# Patient Record
Sex: FEMALE | Race: WHITE | NOT HISPANIC OR LATINO
[De-identification: names, ages, dates, MRNs, and addresses within clinical notes are randomized per-mention and may not be internally consistent; named-entity substitution may affect disease eponyms.]

---

## 2017-12-01 ENCOUNTER — APPOINTMENT (OUTPATIENT)
Dept: GASTROENTEROLOGY | Facility: CLINIC | Age: 67
End: 2017-12-01
Payer: MEDICARE

## 2017-12-01 PROCEDURE — 99214 OFFICE O/P EST MOD 30 MIN: CPT

## 2017-12-01 PROCEDURE — 82274 ASSAY TEST FOR BLOOD FECAL: CPT | Mod: QW

## 2018-11-20 ENCOUNTER — APPOINTMENT (OUTPATIENT)
Dept: GASTROENTEROLOGY | Facility: CLINIC | Age: 68
End: 2018-11-20
Payer: MEDICARE

## 2018-11-20 VITALS
HEIGHT: 62 IN | WEIGHT: 111 LBS | DIASTOLIC BLOOD PRESSURE: 65 MMHG | SYSTOLIC BLOOD PRESSURE: 114 MMHG | BODY MASS INDEX: 20.43 KG/M2 | HEART RATE: 73 BPM

## 2018-11-20 DIAGNOSIS — Z85.72 PERSONAL HISTORY OF NON-HODGKIN LYMPHOMAS: ICD-10-CM

## 2018-11-20 DIAGNOSIS — Z80.1 FAMILY HISTORY OF MALIGNANT NEOPLASM OF TRACHEA, BRONCHUS AND LUNG: ICD-10-CM

## 2018-11-20 DIAGNOSIS — M19.90 UNSPECIFIED OSTEOARTHRITIS, UNSPECIFIED SITE: ICD-10-CM

## 2018-11-20 PROCEDURE — 99214 OFFICE O/P EST MOD 30 MIN: CPT

## 2018-11-20 PROCEDURE — 82274 ASSAY TEST FOR BLOOD FECAL: CPT | Mod: QW

## 2019-04-12 ENCOUNTER — APPOINTMENT (OUTPATIENT)
Dept: OTOLARYNGOLOGY | Facility: CLINIC | Age: 69
End: 2019-04-12
Payer: MEDICARE

## 2019-04-12 VITALS
WEIGHT: 115 LBS | HEIGHT: 62 IN | SYSTOLIC BLOOD PRESSURE: 116 MMHG | BODY MASS INDEX: 21.16 KG/M2 | DIASTOLIC BLOOD PRESSURE: 70 MMHG | HEART RATE: 56 BPM

## 2019-04-12 DIAGNOSIS — J00 ACUTE NASOPHARYNGITIS [COMMON COLD]: ICD-10-CM

## 2019-04-12 PROCEDURE — 31231 NASAL ENDOSCOPY DX: CPT

## 2019-04-12 PROCEDURE — 99203 OFFICE O/P NEW LOW 30 MIN: CPT | Mod: 25

## 2019-04-12 NOTE — CONSULT LETTER
[Dear  ___] : Dear  [unfilled], [Consult Closing:] : Thank you very much for allowing me to participate in the care of this patient.  If you have any questions, please do not hesitate to contact me. [Please see my note below.] : Please see my note below. [Consult Letter:] : I had the pleasure of evaluating your patient, [unfilled]. [FreeTextEntry3] : Herve Baldwin MD\par  [Sincerely,] : Sincerely,

## 2019-04-12 NOTE — ASSESSMENT
[FreeTextEntry1] : KIMMIE WARD most probably ahs fungal glossitis. I cultured her tongue and will start her on Mycelex troches. RTC 3 weeks.

## 2019-04-12 NOTE — HISTORY OF PRESENT ILLNESS
[de-identified] : KIMMIE WARD is a 68 year woman with a history of abrupt onset (March 7,2019) of burning tongue, tingling tongue and loss of taste. She has not been on antibiotics or steroids recently. She has a cold for about 5 days with drainage.\par She noted a painless mass on her right lateral tongue that is getting smaller.

## 2019-04-23 LAB — FUNGUS SPEC CULT ORG #8: ABNORMAL

## 2019-05-21 ENCOUNTER — APPOINTMENT (OUTPATIENT)
Dept: OTOLARYNGOLOGY | Facility: CLINIC | Age: 69
End: 2019-05-21
Payer: MEDICARE

## 2019-05-21 VITALS
HEIGHT: 62 IN | BODY MASS INDEX: 21.16 KG/M2 | WEIGHT: 115 LBS | HEART RATE: 58 BPM | SYSTOLIC BLOOD PRESSURE: 112 MMHG | DIASTOLIC BLOOD PRESSURE: 72 MMHG

## 2019-05-21 DIAGNOSIS — K14.0 GLOSSITIS: ICD-10-CM

## 2019-05-21 PROCEDURE — 99213 OFFICE O/P EST LOW 20 MIN: CPT

## 2019-05-21 NOTE — ASSESSMENT
[FreeTextEntry1] : KIMMIE WARD is feeling much better. I suggested rinsing with salt water and RTC 8 weeks to reevaluate tongue.

## 2019-05-21 NOTE — HISTORY OF PRESENT ILLNESS
[de-identified] : KIMMIE WARD is a 68 year woman with a history of abrupt onset (March 7,2019) of burning tongue, tingling tongue and loss of taste. I treated her with Mycelex and she is much better. She still has small area on RIGHT lateral tongue that seems to get bigger and smaller.

## 2019-11-07 ENCOUNTER — APPOINTMENT (OUTPATIENT)
Dept: GASTROENTEROLOGY | Facility: CLINIC | Age: 69
End: 2019-11-07
Payer: MEDICARE

## 2019-11-07 VITALS
WEIGHT: 115 LBS | BODY MASS INDEX: 21.16 KG/M2 | DIASTOLIC BLOOD PRESSURE: 59 MMHG | HEART RATE: 68 BPM | SYSTOLIC BLOOD PRESSURE: 95 MMHG | HEIGHT: 62 IN

## 2019-11-07 DIAGNOSIS — Z85.79 PERSONAL HISTORY OF OTHER MALIGNANT NEOPLASMS OF LYMPHOID, HEMATOPOIETIC AND RELATED TISSUES: ICD-10-CM

## 2019-11-07 DIAGNOSIS — K14.6 GLOSSODYNIA: ICD-10-CM

## 2019-11-07 PROCEDURE — 99214 OFFICE O/P EST MOD 30 MIN: CPT

## 2019-11-07 PROCEDURE — 82274 ASSAY TEST FOR BLOOD FECAL: CPT | Mod: QW

## 2019-11-07 RX ORDER — CLOTRIMAZOLE 10 MG/1
10 LOZENGE ORAL DAILY
Qty: 50 | Refills: 0 | Status: DISCONTINUED | COMMUNITY
Start: 2019-04-15 | End: 2019-11-07

## 2019-11-07 NOTE — PHYSICAL EXAM
[General Appearance - Well Developed] : well developed [General Appearance - In No Acute Distress] : in no acute distress [General Appearance - Alert] : alert [General Appearance - Well Nourished] : well nourished [General Appearance - Well-Appearing] : healthy appearing [Sclera] : the sclera and conjunctiva were normal [Outer Ear] : the ears and nose were normal in appearance [Oropharynx] : the oropharynx was normal [Neck Appearance] : the appearance of the neck was normal [Neck Cervical Mass (___cm)] : no neck mass was observed [Thyroid Diffuse Enlargement] : the thyroid was not enlarged [Thyroid Nodule] : there were no palpable thyroid nodules [Jugular Venous Distention Increased] : there was no jugular-venous distention [Auscultation Breath Sounds / Voice Sounds] : lungs were clear to auscultation bilaterally [Heart Rate And Rhythm] : heart rate was normal and rhythm regular [Heart Sounds Gallop] : no gallops [Heart Sounds] : normal S1 and S2 [Murmurs] : no murmurs [Full Pulse] : the pedal pulses are present [Heart Sounds Pericardial Friction Rub] : no pericardial rub [Edema] : there was no peripheral edema [Bowel Sounds] : normal bowel sounds [Abdomen Soft] : soft [Abdomen Tenderness] : non-tender [Abdomen Mass (___ Cm)] : no abdominal mass palpated [Abdomen Hernia] : no hernia was discovered [Normal Sphincter Tone] : normal sphincter tone [No Rectal Mass] : no rectal mass [Internal Hemorrhoid] : internal hemorrhoids [Occult Blood Positive] : stool positive for occult blood [Cervical Lymph Nodes Enlarged Anterior Bilaterally] : anterior cervical [Cervical Lymph Nodes Enlarged Posterior Bilaterally] : posterior cervical [Axillary Lymph Nodes Enlarged Bilaterally] : axillary [Supraclavicular Lymph Nodes Enlarged Bilaterally] : supraclavicular [Femoral Lymph Nodes Enlarged Bilaterally] : femoral [No CVA Tenderness] : no ~M costovertebral angle tenderness [Inguinal Lymph Nodes Enlarged Bilaterally] : inguinal [No Spinal Tenderness] : no spinal tenderness [Abnormal Walk] : normal gait [Musculoskeletal - Swelling] : no joint swelling seen [Nail Clubbing] : no clubbing  or cyanosis of the fingernails [Motor Tone] : muscle strength and tone were normal [Skin Turgor] : normal skin turgor [Skin Color & Pigmentation] : normal skin color and pigmentation [] : no rash [Oriented To Time, Place, And Person] : oriented to person, place, and time [Impaired Insight] : insight and judgment were intact [Affect] : the affect was normal [External Hemorrhoid] : no external hemorrhoids [FreeTextEntry1] : FIT negative

## 2019-11-07 NOTE — HISTORY OF PRESENT ILLNESS
[FreeTextEntry1] : Rosalba reports mouth burning, but ENT and oral surgical evaluations have been unrevealing. She denies GI symptoms at this time, maintained on a "low acid diet." She is on no medicines at this time.

## 2019-11-07 NOTE — ASSESSMENT
[FreeTextEntry1] : 1. Guaiac positive stool on today's exam--source unclear. \par 2. History of GERD; duodenal ulcer with duodenitis, gastric polyps, reflux and mild lymphocytic esophagitis at repeat EGD November 2016.\par 3. Spasm, hemorrhoids at last colonoscopy May 2015.\par 4. History of non-Hodgkin's lymphoma in 1996, status post chemotherapy and bone marrow transplant.\par 5. Burning tongue of unclear cause--?underlying vitamin/mineral deficiency could be responsible. \par 6. Osteoarthritis.\par 7. Status post total abdominal hysterectomy.\par 8. Allergic to Augmentin.\par \par Plan:\par 1. Dr. Cueva to forward latest labs for my review.\par 2. Try multivitamin and vitamin B complex daily for at least 1-2 months to see if tongue burning subsides.\par 3. Given heme + stool on today's exam, schedule repeat colonoscopy, with probable EGD to follow-- Procedures, rationale, anesthesia plan, MiraLax prep instructions were again reviewed and brochures given.\par

## 2019-11-07 NOTE — CONSULT LETTER
[Dear  ___] : Dear  [unfilled], [Courtesy Letter:] : I had the pleasure of seeing your patient, [unfilled], in my office today. [Please see my note below.] : Please see my note below. [Consult Closing:] : Thank you very much for allowing me to participate in the care of this patient.  If you have any questions, please do not hesitate to contact me. [Sincerely,] : Sincerely, [FreeTextEntry3] : Francis Reyes M.D.\par

## 2019-11-07 NOTE — PHYSICAL EXAM
[General Appearance - Well Nourished] : well nourished [General Appearance - Well Developed] : well developed [General Appearance - In No Acute Distress] : in no acute distress [General Appearance - Alert] : alert [General Appearance - Well-Appearing] : healthy appearing [Sclera] : the sclera and conjunctiva were normal [Outer Ear] : the ears and nose were normal in appearance [Oropharynx] : the oropharynx was normal [Neck Cervical Mass (___cm)] : no neck mass was observed [Neck Appearance] : the appearance of the neck was normal [Jugular Venous Distention Increased] : there was no jugular-venous distention [Thyroid Nodule] : there were no palpable thyroid nodules [Thyroid Diffuse Enlargement] : the thyroid was not enlarged [Heart Rate And Rhythm] : heart rate was normal and rhythm regular [Auscultation Breath Sounds / Voice Sounds] : lungs were clear to auscultation bilaterally [Heart Sounds Gallop] : no gallops [Heart Sounds] : normal S1 and S2 [Murmurs] : no murmurs [Heart Sounds Pericardial Friction Rub] : no pericardial rub [Full Pulse] : the pedal pulses are present [Edema] : there was no peripheral edema [Bowel Sounds] : normal bowel sounds [Abdomen Soft] : soft [Abdomen Tenderness] : non-tender [Abdomen Mass (___ Cm)] : no abdominal mass palpated [Abdomen Hernia] : no hernia was discovered [Normal Sphincter Tone] : normal sphincter tone [No Rectal Mass] : no rectal mass [Internal Hemorrhoid] : internal hemorrhoids [Occult Blood Positive] : stool positive for occult blood [Cervical Lymph Nodes Enlarged Anterior Bilaterally] : anterior cervical [Cervical Lymph Nodes Enlarged Posterior Bilaterally] : posterior cervical [Axillary Lymph Nodes Enlarged Bilaterally] : axillary [Supraclavicular Lymph Nodes Enlarged Bilaterally] : supraclavicular [Femoral Lymph Nodes Enlarged Bilaterally] : femoral [Inguinal Lymph Nodes Enlarged Bilaterally] : inguinal [No CVA Tenderness] : no ~M costovertebral angle tenderness [No Spinal Tenderness] : no spinal tenderness [Abnormal Walk] : normal gait [Nail Clubbing] : no clubbing  or cyanosis of the fingernails [Musculoskeletal - Swelling] : no joint swelling seen [Motor Tone] : muscle strength and tone were normal [Skin Color & Pigmentation] : normal skin color and pigmentation [Skin Turgor] : normal skin turgor [] : no rash [Oriented To Time, Place, And Person] : oriented to person, place, and time [Impaired Insight] : insight and judgment were intact [Affect] : the affect was normal [External Hemorrhoid] : no external hemorrhoids [FreeTextEntry1] : FIT negative

## 2019-11-25 ENCOUNTER — LABORATORY RESULT (OUTPATIENT)
Age: 69
End: 2019-11-25

## 2019-11-25 ENCOUNTER — APPOINTMENT (OUTPATIENT)
Dept: GASTROENTEROLOGY | Facility: CLINIC | Age: 69
End: 2019-11-25
Payer: MEDICARE

## 2019-11-25 PROCEDURE — 43239 EGD BIOPSY SINGLE/MULTIPLE: CPT | Mod: 59

## 2019-11-25 PROCEDURE — 45378 DIAGNOSTIC COLONOSCOPY: CPT

## 2019-12-05 ENCOUNTER — OTHER (OUTPATIENT)
Age: 69
End: 2019-12-05

## 2020-11-17 ENCOUNTER — APPOINTMENT (OUTPATIENT)
Dept: GASTROENTEROLOGY | Facility: CLINIC | Age: 70
End: 2020-11-17
Payer: MEDICARE

## 2020-11-17 VITALS
TEMPERATURE: 97.3 F | HEIGHT: 61 IN | WEIGHT: 108 LBS | BODY MASS INDEX: 20.39 KG/M2 | DIASTOLIC BLOOD PRESSURE: 76 MMHG | HEART RATE: 69 BPM | SYSTOLIC BLOOD PRESSURE: 150 MMHG

## 2020-11-17 DIAGNOSIS — K64.9 UNSPECIFIED HEMORRHOIDS: ICD-10-CM

## 2020-11-17 DIAGNOSIS — K44.9 DIAPHRAGMATIC HERNIA W/OUT OBSTRUCTION OR GANGRENE: ICD-10-CM

## 2020-11-17 DIAGNOSIS — R19.5 OTHER FECAL ABNORMALITIES: ICD-10-CM

## 2020-11-17 DIAGNOSIS — K57.30 DIVERTICULOSIS OF LARGE INTESTINE W/OUT PERFORATION OR ABSCESS W/OUT BLEEDING: ICD-10-CM

## 2020-11-17 PROCEDURE — 99214 OFFICE O/P EST MOD 30 MIN: CPT | Mod: 25

## 2020-11-17 PROCEDURE — 82274 ASSAY TEST FOR BLOOD FECAL: CPT | Mod: QW

## 2020-11-17 PROCEDURE — 99205 OFFICE O/P NEW HI 60 MIN: CPT

## 2020-11-17 PROCEDURE — 99215 OFFICE O/P EST HI 40 MIN: CPT

## 2020-11-17 RX ORDER — OMEPRAZOLE 40 MG/1
40 CAPSULE, DELAYED RELEASE ORAL
Qty: 90 | Refills: 1 | Status: DISCONTINUED | COMMUNITY
Start: 2019-12-09 | End: 2020-11-17

## 2020-11-17 NOTE — PHYSICAL EXAM
[General Appearance - Alert] : alert [General Appearance - In No Acute Distress] : in no acute distress [General Appearance - Well Nourished] : well nourished [General Appearance - Well Developed] : well developed [General Appearance - Well-Appearing] : healthy appearing [Sclera] : the sclera and conjunctiva were normal [Neck Appearance] : the appearance of the neck was normal [Neck Cervical Mass (___cm)] : no neck mass was observed [Jugular Venous Distention Increased] : there was no jugular-venous distention [Thyroid Diffuse Enlargement] : the thyroid was not enlarged [Thyroid Nodule] : there were no palpable thyroid nodules [Auscultation Breath Sounds / Voice Sounds] : lungs were clear to auscultation bilaterally [Heart Rate And Rhythm] : heart rate was normal and rhythm regular [Heart Sounds] : normal S1 and S2 [Heart Sounds Gallop] : no gallops [Murmurs] : no murmurs [Heart Sounds Pericardial Friction Rub] : no pericardial rub [Full Pulse] : the pedal pulses are present [Edema] : there was no peripheral edema [Bowel Sounds] : normal bowel sounds [Abdomen Soft] : soft [Abdomen Tenderness] : non-tender [Abdomen Mass (___ Cm)] : no abdominal mass palpated [Abdomen Hernia] : no hernia was discovered [Normal Sphincter Tone] : normal sphincter tone [No Rectal Mass] : no rectal mass [Internal Hemorrhoid] : internal hemorrhoids [Cervical Lymph Nodes Enlarged Posterior Bilaterally] : posterior cervical [Cervical Lymph Nodes Enlarged Anterior Bilaterally] : anterior cervical [Supraclavicular Lymph Nodes Enlarged Bilaterally] : supraclavicular [Inguinal Lymph Nodes Enlarged Bilaterally] : inguinal [Abnormal Walk] : normal gait [Nail Clubbing] : no clubbing  or cyanosis of the fingernails [Musculoskeletal - Swelling] : no joint swelling seen [Skin Color & Pigmentation] : normal skin color and pigmentation [Skin Turgor] : normal skin turgor [] : no rash [Oriented To Time, Place, And Person] : oriented to person, place, and time [Impaired Insight] : insight and judgment were intact [Affect] : the affect was normal [External Hemorrhoid] : no external hemorrhoids [Occult Blood Positive] : stool was negative for occult blood [FreeTextEntry1] : prolapsing internal hemorrhoid

## 2020-11-17 NOTE — HISTORY OF PRESENT ILLNESS
[FreeTextEntry1] : She has been somewhat more constipated lately, straining to defecate, noting more issues with prolapsing hemorrhoids.  If she eats pasta too fast, sometimes it gets stuck, but ultimately goes down.  She took omeprazole 40 mg daily x 3 months after last November's EGD (hiatal hernia, nonerosive esophagitis and duodenitis), and now denies reflux symptoms off the PPI.  Repeat colonoscopy November 2019 revealed diverticulosis and hemorrhoids, with presumed adhesions.

## 2020-11-17 NOTE — ASSESSMENT
[FreeTextEntry1] : 1. History of GERD, now asymptomatic off PPI; history of duodenal ulcer, with hiatal hernia and nonerosive esophagitis and duodenitis at repeat EGD November 2019.  \par 2.  Diverticulosis, hemorrhoids, presumed adhesions at repeat colonoscopy November 2019.  Constipation could be causing issues with her prolapsing hemorrhoids.  \par 3. History of non-Hodgkin's lymphoma in 1996, status post chemotherapy and bone marrow transplant.\par 4. Osteoarthritis.\par 5. Status post total abdominal hysterectomy.\par 6. Allergic to Augmentin.\par \par Plan:\par 1. Dr. Toney to forward latest labs for my review.\par 2. Try Benefiber 1 heaping tablespoon in 8 ounces of fluid daily; can increase to twice daily dosing after a couple weeks if needed.\par 3. Contact information for [facility] given. \par 4. Return in 1 year.\par 3. No plans to repeat EGD at this time.

## 2020-12-01 ENCOUNTER — TRANSCRIPTION ENCOUNTER (OUTPATIENT)
Age: 70
End: 2020-12-01

## 2021-02-04 ENCOUNTER — APPOINTMENT (OUTPATIENT)
Dept: ENDOCRINOLOGY | Facility: CLINIC | Age: 71
End: 2021-02-04
Payer: MEDICARE

## 2021-02-04 VITALS
OXYGEN SATURATION: 96 % | SYSTOLIC BLOOD PRESSURE: 120 MMHG | TEMPERATURE: 97.8 F | BODY MASS INDEX: 20.39 KG/M2 | HEART RATE: 81 BPM | HEIGHT: 61 IN | DIASTOLIC BLOOD PRESSURE: 60 MMHG | WEIGHT: 108 LBS

## 2021-02-04 DIAGNOSIS — Z92.3 PERSONAL HISTORY OF IRRADIATION: ICD-10-CM

## 2021-02-04 PROCEDURE — 99072 ADDL SUPL MATRL&STAF TM PHE: CPT

## 2021-02-04 PROCEDURE — 99204 OFFICE O/P NEW MOD 45 MIN: CPT

## 2021-02-05 ENCOUNTER — APPOINTMENT (OUTPATIENT)
Dept: ULTRASOUND IMAGING | Facility: CLINIC | Age: 71
End: 2021-02-05
Payer: MEDICARE

## 2021-02-05 ENCOUNTER — OUTPATIENT (OUTPATIENT)
Dept: OUTPATIENT SERVICES | Facility: HOSPITAL | Age: 71
LOS: 1 days | End: 2021-02-05
Payer: MEDICARE

## 2021-02-05 DIAGNOSIS — Z00.00 ENCOUNTER FOR GENERAL ADULT MEDICAL EXAMINATION WITHOUT ABNORMAL FINDINGS: ICD-10-CM

## 2021-02-05 PROCEDURE — 76536 US EXAM OF HEAD AND NECK: CPT

## 2021-02-05 PROCEDURE — 76536 US EXAM OF HEAD AND NECK: CPT | Mod: 26

## 2021-02-11 LAB
25(OH)D3 SERPL-MCNC: 34.4 NG/ML
T4 FREE SERPL-MCNC: 1 NG/DL
THYROGLOB AB SERPL-ACNC: 22.3 IU/ML
THYROPEROXIDASE AB SERPL IA-ACNC: <10 IU/ML
TSH SERPL-ACNC: 6.05 UIU/ML

## 2021-09-09 ENCOUNTER — EMERGENCY (EMERGENCY)
Facility: HOSPITAL | Age: 71
LOS: 1 days | Discharge: HOME | End: 2021-09-09
Attending: EMERGENCY MEDICINE
Payer: MEDICARE

## 2021-09-09 VITALS
DIASTOLIC BLOOD PRESSURE: 92 MMHG | RESPIRATION RATE: 18 BRPM | HEART RATE: 88 BPM | OXYGEN SATURATION: 96 % | SYSTOLIC BLOOD PRESSURE: 122 MMHG

## 2021-09-09 LAB
ALBUMIN SERPL ELPH-MCNC: 4.2 G/DL — SIGNIFICANT CHANGE UP (ref 3.3–5)
ALP SERPL-CCNC: 76 U/L — SIGNIFICANT CHANGE UP (ref 40–120)
ALT FLD-CCNC: 11 U/L — SIGNIFICANT CHANGE UP (ref 10–45)
ANION GAP SERPL CALC-SCNC: 15 MMOL/L — SIGNIFICANT CHANGE UP (ref 5–17)
AST SERPL-CCNC: 16 U/L — SIGNIFICANT CHANGE UP (ref 10–40)
BASOPHILS # BLD AUTO: 0.02 K/UL — SIGNIFICANT CHANGE UP (ref 0–0.2)
BASOPHILS NFR BLD AUTO: 0.3 % — SIGNIFICANT CHANGE UP (ref 0–2)
BILIRUB SERPL-MCNC: 0.4 MG/DL — SIGNIFICANT CHANGE UP (ref 0.2–1.2)
BUN SERPL-MCNC: 16 MG/DL — SIGNIFICANT CHANGE UP (ref 7–23)
CALCIUM SERPL-MCNC: 9.7 MG/DL — SIGNIFICANT CHANGE UP (ref 8.4–10.5)
CHLORIDE SERPL-SCNC: 105 MMOL/L — SIGNIFICANT CHANGE UP (ref 96–108)
CO2 SERPL-SCNC: 20 MMOL/L — LOW (ref 22–31)
CREAT SERPL-MCNC: 0.92 MG/DL — SIGNIFICANT CHANGE UP (ref 0.5–1.3)
EOSINOPHIL # BLD AUTO: 0.02 K/UL — SIGNIFICANT CHANGE UP (ref 0–0.5)
EOSINOPHIL NFR BLD AUTO: 0.3 % — SIGNIFICANT CHANGE UP (ref 0–6)
GLUCOSE SERPL-MCNC: 112 MG/DL — HIGH (ref 70–99)
HCT VFR BLD CALC: 39.8 % — SIGNIFICANT CHANGE UP (ref 34.5–45)
HGB BLD-MCNC: 12.7 G/DL — SIGNIFICANT CHANGE UP (ref 11.5–15.5)
IMM GRANULOCYTES NFR BLD AUTO: 0.7 % — SIGNIFICANT CHANGE UP (ref 0–1.5)
LYMPHOCYTES # BLD AUTO: 1.96 K/UL — SIGNIFICANT CHANGE UP (ref 1–3.3)
LYMPHOCYTES # BLD AUTO: 25.6 % — SIGNIFICANT CHANGE UP (ref 13–44)
MCHC RBC-ENTMCNC: 29 PG — SIGNIFICANT CHANGE UP (ref 27–34)
MCHC RBC-ENTMCNC: 31.9 GM/DL — LOW (ref 32–36)
MCV RBC AUTO: 90.9 FL — SIGNIFICANT CHANGE UP (ref 80–100)
MONOCYTES # BLD AUTO: 0.56 K/UL — SIGNIFICANT CHANGE UP (ref 0–0.9)
MONOCYTES NFR BLD AUTO: 7.3 % — SIGNIFICANT CHANGE UP (ref 2–14)
NEUTROPHILS # BLD AUTO: 5.04 K/UL — SIGNIFICANT CHANGE UP (ref 1.8–7.4)
NEUTROPHILS NFR BLD AUTO: 65.8 % — SIGNIFICANT CHANGE UP (ref 43–77)
NRBC # BLD: 0 /100 WBCS — SIGNIFICANT CHANGE UP (ref 0–0)
PLATELET # BLD AUTO: 183 K/UL — SIGNIFICANT CHANGE UP (ref 150–400)
POTASSIUM SERPL-MCNC: 4.1 MMOL/L — SIGNIFICANT CHANGE UP (ref 3.5–5.3)
POTASSIUM SERPL-SCNC: 4.1 MMOL/L — SIGNIFICANT CHANGE UP (ref 3.5–5.3)
PROT SERPL-MCNC: 7.4 G/DL — SIGNIFICANT CHANGE UP (ref 6–8.3)
RBC # BLD: 4.38 M/UL — SIGNIFICANT CHANGE UP (ref 3.8–5.2)
RBC # FLD: 13.9 % — SIGNIFICANT CHANGE UP (ref 10.3–14.5)
SODIUM SERPL-SCNC: 140 MMOL/L — SIGNIFICANT CHANGE UP (ref 135–145)
WBC # BLD: 7.65 K/UL — SIGNIFICANT CHANGE UP (ref 3.8–10.5)
WBC # FLD AUTO: 7.65 K/UL — SIGNIFICANT CHANGE UP (ref 3.8–10.5)

## 2021-09-09 PROCEDURE — 80053 COMPREHEN METABOLIC PANEL: CPT

## 2021-09-09 PROCEDURE — 85025 COMPLETE CBC W/AUTO DIFF WBC: CPT

## 2021-09-09 PROCEDURE — 99283 EMERGENCY DEPT VISIT LOW MDM: CPT

## 2021-09-09 PROCEDURE — 99284 EMERGENCY DEPT VISIT MOD MDM: CPT

## 2021-09-09 PROCEDURE — 93005 ELECTROCARDIOGRAM TRACING: CPT

## 2021-09-09 PROCEDURE — 82962 GLUCOSE BLOOD TEST: CPT

## 2021-09-09 PROCEDURE — 93010 ELECTROCARDIOGRAM REPORT: CPT

## 2021-09-09 RX ORDER — SODIUM CHLORIDE 9 MG/ML
1000 INJECTION, SOLUTION INTRAVENOUS ONCE
Refills: 0 | Status: DISCONTINUED | OUTPATIENT
Start: 2021-09-09 | End: 2021-09-09

## 2021-09-09 NOTE — ED PROVIDER NOTE - CLINICAL SUMMARY MEDICAL DECISION MAKING FREE TEXT BOX
70y p/w syncope while visiting  in the ED. preceded by blackened vision and feeling "queasy." frequent LOC in medical settings. vitals wnl. FS wnl. ekg NSR without acute pathology. high suspicion for vasovagal syncope vs orthostatic, low suspicion for arrhythmia or acute neurologic cause. Will check basic labs, ekg, IV or PO fluids, reassess - Lyle Rangel PA-C 70y p/w syncope while visiting  in the ED. preceded by blackened vision and feeling "queasy." frequent LOC in medical settings. vitals wnl. FS wnl. ekg NSR without acute pathology. high suspicion for vasovagal syncope vs orthostatic, low suspicion for arrhythmia or acute neurologic cause. Will check basic labs, ekg, IV or PO fluids, reassess - Lyle Rangel PA-C  ATTG: : syncope brief resolved episode, check labs, fingerstick, ekg and re eval  shortly after patient started to feel better and refused further care / work up. she will follow up with her pmd and understands the risks of not allowing the completion of full work up.

## 2021-09-09 NOTE — ED PROVIDER NOTE - OBJECTIVE STATEMENT
70y F PMHx anxiety, non-hodgkin's lymphoma (20+ yrs ago) p/w syncope. Pt syncopized in ED [place] while accompanying  who is currently a patient. Pt was sitting in a chair, stated "I feel like Im going to pass out" and briefly lost consciousness while seated. pt states that preceding the LOC she felt queasy seeing her pt receiving an X-ray, felt her vision going black. Denies preceding symptoms such as nausea, CP, SOB, or palpiations. pt reports passing out frequently in medical settings. Denies CP, SOB, fever, chills, dizziness, HA, numbness, or weakness

## 2021-09-09 NOTE — ED PROVIDER NOTE - NSFOLLOWUPINSTRUCTIONS_ED_ALL_ED_FT
Please follow up with your primary care doctor within 1 week.  *Bring all printed lab/test results to your appointment(s).*    Stay well hydrated with water and electrolyte replacement solutions such as Pedialyte or the adult equivalent.    Return to the ED for recurrent episodes of loss of consciousness, chest pain, shortness of breath, dizziness, palpitations, or any other concerns.

## 2021-09-09 NOTE — ED PROVIDER NOTE - PROGRESS NOTE DETAILS
Pt feels better, all lab results reviewed with pt. Will dc with follow up. Discussed plan and return precautions with patient who understands and agrees. All questions answered. - Lyle Rangel PA-C

## 2021-09-09 NOTE — ED PROVIDER NOTE - ATTENDING CONTRIBUTION TO CARE
71 y/o f with pmhx anxiety, non hodgkin's lymphoma, was here visiting with her  who is patient and felt suddenly lightheaded and passed out. patient was placed initially in a chair and then on a stretcher and has a brief episode of syncope. She states she gets this when she is anxious and denies any other complaints. no fever no chills. no weakness or numbness. no back pain. patient states she felt immediately better after being placed in stretcher in Trendelenburg.   Gen.  no acute distress  HEENT:  perrl eomi  Lungs:  b/l bs  CVS: S1S2   Abd;  soft non tender no distention  Ext: no edema or erythema  Neuro: aaox3 no focal deficits  MSK: strength 5/5 b/l upper and lower ext

## 2021-09-09 NOTE — ED ADULT NURSE NOTE - OBJECTIVE STATEMENT
71 y/o female s/p syncope. Pt is A&Ox3, speaking coherently. Pt had a syncopal episode while visiting  in the ED, Pt became pale, stated she     was help to the stretcher, no head trauma, positive LOC.  Pt states having anxiety attacks, having syncope episode before due to the anxiety. Pt is well appearing, speaking full sentences without difficulty. Breathing spontaneous and unlabored.  Abdomen is soft, no distended, no tender. Full ROM in all extremities. Pt placed on continuous pulse ox and cardiac monitor, NSR noted, Labs sent. EKG done. Denies SOB, chest pain, dizziness, N/V/D, urinary symptoms, fevers, chills. Comfort measures provided. call bell within reach, bed locked in the lowest position. will continue to reassess. 69 y/o female s/p syncope. Pt is A&Ox3, speaking coherently. Pt had a syncopal episode while visiting  in the ED, Pt became pale, stated she " I feel like I am going to pass out" while sitting on a chair. Pt was help to the stretcher, no head trauma, positive LOC.  Pt states having anxiety attacks, having syncope episode like this  before due to the anxiety. Pt is well appearing, speaking full sentences without difficulty. Breathing spontaneous and unlabored.  Abdomen is soft, no distended, no tender. Full ROM in all extremities. Pt placed on continuous pulse ox and cardiac monitor, NSR noted, Labs sent. EKG done. Denies SOB, chest pain, dizziness, N/V/D, urinary symptoms, fevers, chills. Comfort measures provided. call bell within reach, bed locked in the lowest position. will continue to reassess.

## 2021-09-09 NOTE — ED PROVIDER NOTE - PATIENT PORTAL LINK FT
You can access the FollowMyHealth Patient Portal offered by [facility] by registering at the following website: [email or web address]. By joining [facility]’s FollowMyHealth portal, you will also be able to view your health information using other applications (apps) compatible with our system.

## 2021-09-27 ENCOUNTER — APPOINTMENT (OUTPATIENT)
Dept: ENDOCRINOLOGY | Facility: CLINIC | Age: 71
End: 2021-09-27
Payer: MEDICARE

## 2021-09-27 VITALS
OXYGEN SATURATION: 97 % | HEIGHT: 61.81 IN | SYSTOLIC BLOOD PRESSURE: 122 MMHG | BODY MASS INDEX: 19.82 KG/M2 | WEIGHT: 107.69 LBS | HEART RATE: 76 BPM | DIASTOLIC BLOOD PRESSURE: 76 MMHG | TEMPERATURE: 97.3 F

## 2021-09-27 DIAGNOSIS — Z00.00 ENCOUNTER FOR GENERAL ADULT MEDICAL EXAMINATION W/OUT ABNORMAL FINDINGS: ICD-10-CM

## 2021-09-27 PROBLEM — C85.90 NON-HODGKIN LYMPHOMA, UNSPECIFIED, UNSPECIFIED SITE: Chronic | Status: ACTIVE | Noted: 2021-09-09

## 2021-09-27 PROCEDURE — 99213 OFFICE O/P EST LOW 20 MIN: CPT | Mod: 25

## 2021-09-27 PROCEDURE — 36415 COLL VENOUS BLD VENIPUNCTURE: CPT

## 2021-09-28 ENCOUNTER — NON-APPOINTMENT (OUTPATIENT)
Age: 71
End: 2021-09-28

## 2021-09-28 LAB
T4 FREE SERPL-MCNC: 1 NG/DL
TSH SERPL-ACNC: 7.69 UIU/ML

## 2021-09-28 NOTE — ASSESSMENT
[FreeTextEntry1] : This is a 70-year-old female with history of non-Hodgkin's lymphoma status post stem cell transplant/RT to head and neck, and chemotherapy, subclinical hypothyroidism, thyroid nodules, vitamin D insufficiency, here for evaluation.\par She is not currently on thyroid hormone replacement.\par Thyroid antibodies are negative.\par She is clinically euthyroid.\par Check TFTs.\par Check thyroid ultrasound for history of thyroid nodules.\par Check screening DEXA.\par Further management pending results.

## 2021-09-28 NOTE — PHYSICAL EXAM
[Alert] : alert [Well Nourished] : well nourished [Healthy Appearance] : healthy appearance [No Acute Distress] : no acute distress [Well Developed] : well developed [Normal Voice/Communication] : normal voice communication [Normal Sclera/Conjunctiva] : normal sclera/conjunctiva [No Neck Mass] : no neck mass was observed [No LAD] : no lymphadenopathy [Supple] : the neck was supple [Thyroid Not Enlarged] : the thyroid was not enlarged [No Thyroid Nodules] : no palpable thyroid nodules [No Respiratory Distress] : no respiratory distress [Normal Rate] : heart rate was normal [No Stigmata of Cushings Syndrome] : no stigmata of Cushings Syndrome [Normal Gait] : normal gait [No Clubbing, Cyanosis] : no clubbing  or cyanosis of the fingernails [No Involuntary Movements] : no involuntary movements were seen [Acanthosis Nigricans] : no acanthosis nigricans [No Tremors] : no tremors [Normal Affect] : the affect was normal [Normal Insight/Judgement] : insight and judgment were intact [Normal Mood] : the mood was normal

## 2021-09-28 NOTE — HISTORY OF PRESENT ILLNESS
[FreeTextEntry1] : CC: Thyroid check\par This is a 70-year-old female with history of non-Hodgkin's lymphoma status post stem cell transplant/RT to head and neck, and chemotherapy, subclinical hypothyroidism, thyroid nodules, vitamin D insufficiency, here for evaluation.\par She is not currently on thyroid hormone replacement.\par Thyroid antibodies are negative.\par She reports brittle nails.  Other ROS negative.\par There is no family history of thyroid disease.

## 2021-10-07 ENCOUNTER — OUTPATIENT (OUTPATIENT)
Dept: OUTPATIENT SERVICES | Facility: HOSPITAL | Age: 71
LOS: 1 days | End: 2021-10-07
Payer: MEDICARE

## 2021-10-07 ENCOUNTER — APPOINTMENT (OUTPATIENT)
Dept: RADIOLOGY | Facility: IMAGING CENTER | Age: 71
End: 2021-10-07
Payer: MEDICARE

## 2021-10-07 ENCOUNTER — RESULT REVIEW (OUTPATIENT)
Age: 71
End: 2021-10-07

## 2021-10-07 ENCOUNTER — APPOINTMENT (OUTPATIENT)
Dept: ULTRASOUND IMAGING | Facility: IMAGING CENTER | Age: 71
End: 2021-10-07
Payer: MEDICARE

## 2021-10-07 DIAGNOSIS — Z00.8 ENCOUNTER FOR OTHER GENERAL EXAMINATION: ICD-10-CM

## 2021-10-07 PROCEDURE — 76536 US EXAM OF HEAD AND NECK: CPT

## 2021-10-07 PROCEDURE — 76536 US EXAM OF HEAD AND NECK: CPT | Mod: 26

## 2021-10-07 PROCEDURE — 77080 DXA BONE DENSITY AXIAL: CPT | Mod: 26

## 2021-10-07 PROCEDURE — 77080 DXA BONE DENSITY AXIAL: CPT

## 2021-10-12 ENCOUNTER — NON-APPOINTMENT (OUTPATIENT)
Age: 71
End: 2021-10-12

## 2021-10-15 ENCOUNTER — NON-APPOINTMENT (OUTPATIENT)
Age: 71
End: 2021-10-15

## 2021-10-19 ENCOUNTER — NON-APPOINTMENT (OUTPATIENT)
Age: 71
End: 2021-10-19

## 2021-10-19 ENCOUNTER — APPOINTMENT (OUTPATIENT)
Dept: GASTROENTEROLOGY | Facility: CLINIC | Age: 71
End: 2021-10-19
Payer: MEDICARE

## 2021-10-19 VITALS
HEIGHT: 61 IN | SYSTOLIC BLOOD PRESSURE: 133 MMHG | HEART RATE: 77 BPM | BODY MASS INDEX: 20.2 KG/M2 | DIASTOLIC BLOOD PRESSURE: 74 MMHG | WEIGHT: 107 LBS

## 2021-10-19 PROCEDURE — 99214 OFFICE O/P EST MOD 30 MIN: CPT

## 2021-10-19 PROCEDURE — 82274 ASSAY TEST FOR BLOOD FECAL: CPT | Mod: QW

## 2021-10-19 NOTE — HISTORY OF PRESENT ILLNESS
[FreeTextEntry1] : Rosalba reports occasional constipation.  Reflux symptoms had flared a couple of months ago, improved since restarting omeprazole 40 mg daily.  She was recently diagnosed with thyroid nodules, subclinical hypothyroidism.

## 2021-10-19 NOTE — ASSESSMENT
[FreeTextEntry1] : 1. History of GERD, with recent exacerbation; history of duodenal ulcer, with hiatal hernia and nonerosive esophagitis and duodenitis at repeat EGD November 2019.  \par 2.  Diverticulosis, hemorrhoids, presumed adhesions at repeat colonoscopy November 2019.  Stool guaiac negative with negative fecal immunochemical test on today's exam.\par 3. History of non-Hodgkin's lymphoma in 1996, status post chemotherapy and bone marrow transplant.\par 4. Osteoarthritis.\par 5. Thyroid nodules, subclinical hypothyroidism.\par 6. Status post total abdominal hysterectomy.\par 7. Allergic to Augmentin.\par \par Plan:\par 1.  Interim medical records, including latest labs, reviewed.\par 2.  Continue PPI either regularly or as needed.\par 3.  No plans for repeat EGD at this time.\par 4.  Return in 1 year.

## 2021-10-19 NOTE — REVIEW OF SYSTEMS
[Negative] : Heme/Lymph [As Noted in HPI] : as noted in HPI [Constipation] : constipation [Heartburn] : heartburn

## 2021-10-19 NOTE — PHYSICAL EXAM
[General Appearance - Alert] : alert [General Appearance - In No Acute Distress] : in no acute distress [General Appearance - Well Nourished] : well nourished [General Appearance - Well Developed] : well developed [General Appearance - Well-Appearing] : healthy appearing [Sclera] : the sclera and conjunctiva were normal [Neck Appearance] : the appearance of the neck was normal [Neck Cervical Mass (___cm)] : no neck mass was observed [Jugular Venous Distention Increased] : there was no jugular-venous distention [Auscultation Breath Sounds / Voice Sounds] : lungs were clear to auscultation bilaterally [Heart Rate And Rhythm] : heart rate was normal and rhythm regular [Heart Sounds] : normal S1 and S2 [Heart Sounds Gallop] : no gallops [Murmurs] : no murmurs [Heart Sounds Pericardial Friction Rub] : no pericardial rub [Full Pulse] : the pedal pulses are present [Edema] : there was no peripheral edema [Bowel Sounds] : normal bowel sounds [Abdomen Soft] : soft [Abdomen Tenderness] : non-tender [Abdomen Mass (___ Cm)] : no abdominal mass palpated [Abdomen Hernia] : no hernia was discovered [Normal Sphincter Tone] : normal sphincter tone [No Rectal Mass] : no rectal mass [Internal Hemorrhoid] : internal hemorrhoids [Cervical Lymph Nodes Enlarged Posterior Bilaterally] : posterior cervical [Cervical Lymph Nodes Enlarged Anterior Bilaterally] : anterior cervical [Supraclavicular Lymph Nodes Enlarged Bilaterally] : supraclavicular [Inguinal Lymph Nodes Enlarged Bilaterally] : inguinal [Nail Clubbing] : no clubbing  or cyanosis of the fingernails [Musculoskeletal - Swelling] : no joint swelling seen [Skin Color & Pigmentation] : normal skin color and pigmentation [Skin Turgor] : normal skin turgor [] : no rash [Oriented To Time, Place, And Person] : oriented to person, place, and time [Impaired Insight] : insight and judgment were intact [Affect] : the affect was normal [External Hemorrhoid] : no external hemorrhoids [Occult Blood Positive] : stool was negative for occult blood [FreeTextEntry1] : FIT negative

## 2021-10-25 ENCOUNTER — NON-APPOINTMENT (OUTPATIENT)
Age: 71
End: 2021-10-25

## 2021-10-25 LAB
25(OH)D3 SERPL-MCNC: 34.5 NG/ML
ALBUMIN SERPL ELPH-MCNC: 4.3 G/DL
ALP BLD-CCNC: 76 U/L
ALT SERPL-CCNC: 10 U/L
ANION GAP SERPL CALC-SCNC: 11 MMOL/L
AST SERPL-CCNC: 16 U/L
BILIRUB SERPL-MCNC: 0.3 MG/DL
BUN SERPL-MCNC: 16 MG/DL
CALCIUM SERPL-MCNC: 9.2 MG/DL
CHLORIDE SERPL-SCNC: 107 MMOL/L
CO2 SERPL-SCNC: 24 MMOL/L
CREAT SERPL-MCNC: 0.91 MG/DL
GLUCOSE SERPL-MCNC: 110 MG/DL
POTASSIUM SERPL-SCNC: 4.5 MMOL/L
PROT SERPL-MCNC: 7 G/DL
SODIUM SERPL-SCNC: 142 MMOL/L

## 2021-12-27 ENCOUNTER — APPOINTMENT (OUTPATIENT)
Dept: ENDOCRINOLOGY | Facility: CLINIC | Age: 71
End: 2021-12-27
Payer: MEDICARE

## 2021-12-27 VITALS
HEART RATE: 69 BPM | OXYGEN SATURATION: 98 % | SYSTOLIC BLOOD PRESSURE: 137 MMHG | BODY MASS INDEX: 19.93 KG/M2 | DIASTOLIC BLOOD PRESSURE: 78 MMHG | WEIGHT: 106.91 LBS | HEIGHT: 61.42 IN | TEMPERATURE: 97.3 F

## 2021-12-27 DIAGNOSIS — M81.0 AGE-RELATED OSTEOPOROSIS W/OUT CURRENT PATHOLOGICAL FRACTURE: ICD-10-CM

## 2021-12-27 PROCEDURE — 36415 COLL VENOUS BLD VENIPUNCTURE: CPT

## 2021-12-27 PROCEDURE — 99213 OFFICE O/P EST LOW 20 MIN: CPT | Mod: 25

## 2021-12-30 PROBLEM — M81.0 OSTEOPOROSIS, SENILE: Status: ACTIVE | Noted: 2021-10-12

## 2021-12-30 LAB
25(OH)D3 SERPL-MCNC: 34.3 NG/ML
T4 FREE SERPL-MCNC: 1 NG/DL
TSH SERPL-ACNC: 8.1 UIU/ML

## 2021-12-30 NOTE — REVIEW OF SYSTEMS
[Fatigue] : fatigue [Dry Skin] : dry skin [Hair Loss] : hair loss [All other systems negative] : All other systems negative

## 2021-12-30 NOTE — ASSESSMENT
[FreeTextEntry1] : This is a 71 year-old female with history of non-Hodgkin's lymphoma status post stem cell transplant/RT to head and neck, and chemotherapy, osteoporosis, subclinical hypothyroidism, thyroid nodules, vitamin D insufficiency, here for follow-up.  \par She is not currently on thyroid hormone replacement.\par Thyroid antibodies are negative.\par She is clinically euthyroid.\par Check TFTs.  She reports she does not want treatment for subclinical hypothyroidism at this time.\par Thyroid ultrasound from October 2021 showed right 1 cm thyroid nodule and left subcentimeter cysts.  Check thyroid ultrasound in October 2022.\par She refuses treatment for osteoporosis at this time.  Understands risk of fracture.  Check 25 vitamin D.\par

## 2021-12-30 NOTE — HISTORY OF PRESENT ILLNESS
[FreeTextEntry1] : CC: Thyroid check\par This is a 71-year-old female with history of non-Hodgkin's lymphoma status post stem cell transplant/RT to head and neck, and chemotherapy, subclinical hypothyroidism, thyroid nodules, osteoporosis, vitamin D insufficiency, here for follow-up.\par She is not currently on thyroid hormone replacement.\par Thyroid antibodies are negative.\par There is no family history of thyroid disease.

## 2022-01-03 ENCOUNTER — NON-APPOINTMENT (OUTPATIENT)
Age: 72
End: 2022-01-03

## 2022-04-21 ENCOUNTER — APPOINTMENT (OUTPATIENT)
Dept: GASTROENTEROLOGY | Facility: CLINIC | Age: 72
End: 2022-04-21
Payer: MEDICARE

## 2022-04-21 VITALS
HEIGHT: 61 IN | DIASTOLIC BLOOD PRESSURE: 61 MMHG | WEIGHT: 103 LBS | BODY MASS INDEX: 19.45 KG/M2 | SYSTOLIC BLOOD PRESSURE: 120 MMHG | HEART RATE: 69 BPM

## 2022-04-21 DIAGNOSIS — E78.5 HYPERLIPIDEMIA, UNSPECIFIED: ICD-10-CM

## 2022-04-21 DIAGNOSIS — R19.8 OTHER SPECIFIED SYMPTOMS AND SIGNS INVOLVING THE DIGESTIVE SYSTEM AND ABDOMEN: ICD-10-CM

## 2022-04-21 DIAGNOSIS — Z87.19 PERSONAL HISTORY OF OTHER DISEASES OF THE DIGESTIVE SYSTEM: ICD-10-CM

## 2022-04-21 DIAGNOSIS — R13.10 DYSPHAGIA, UNSPECIFIED: ICD-10-CM

## 2022-04-21 DIAGNOSIS — Z01.818 ENCOUNTER FOR OTHER PREPROCEDURAL EXAMINATION: ICD-10-CM

## 2022-04-21 DIAGNOSIS — R91.8 OTHER NONSPECIFIC ABNORMAL FINDING OF LUNG FIELD: ICD-10-CM

## 2022-04-21 PROCEDURE — 82274 ASSAY TEST FOR BLOOD FECAL: CPT | Mod: QW

## 2022-04-21 PROCEDURE — 99215 OFFICE O/P EST HI 40 MIN: CPT

## 2022-04-21 RX ORDER — ATORVASTATIN CALCIUM 10 MG/1
10 TABLET, FILM COATED ORAL
Refills: 0 | Status: ACTIVE | COMMUNITY

## 2022-04-21 NOTE — HISTORY OF PRESENT ILLNESS
[FreeTextEntry1] : For a few months, Rosalba has noted some gagging and tightening in her throat, better with spitting up.  She showed me cell phone photos from last month when she coughed up some blood-tinged mucus, "like a raisin";  this is more likely to occur after ingesting acidic foods.  She reports occasional dysphagia, most recently yesterday, when eating a turkey sandwich, when it "felt like it was stretching the throat." She restarted Omeprazole 40 mg/day in January.  She has lost a few pounds, attributed to eating a "low-acid diet." She recalls remote history of duodenal ulcer, with last EGD November 2019 revealing nonerosive esophagitis and hiatal hernia.  She has been under marked stress lately, as her 98-year-old mother has been living in same house.  She is undergoing evaluation by Dr. Dixon for small lung nodule, to have chest CT on 5/2/22.

## 2022-04-21 NOTE — CONSULT LETTER
[Dear  ___] : Dear  [unfilled], [Courtesy Letter:] : I had the pleasure of seeing your patient, [unfilled], in my office today. [Please see my note below.] : Please see my note below. [Consult Closing:] : Thank you very much for allowing me to participate in the care of this patient.  If you have any questions, please do not hesitate to contact me. [Sincerely,] : Sincerely, [DrRoslyn  ___] : Dr. ASHFORD [FreeTextEntry3] : Francis Reyes M.D.\par

## 2022-04-21 NOTE — ASSESSMENT
[FreeTextEntry1] : 1. GERD, with recent dysphagia, coughing up blood-tinged mucus; history of duodenal ulcer, with hiatal hernia and nonerosive esophagitis and duodenitis at repeat EGD November 2019--rule out severe esophagitis, neoplasm.  It is possible that the "coughing up heme" is from pulmonary or ENT source..  \par 2.  Diverticulosis, hemorrhoids, presumed adhesions at repeat colonoscopy November 2019.  Stool guaiac negative with negative fecal immunochemical test on today's exam.\par 3. History of non-Hodgkin's lymphoma in 1996, status post chemotherapy and bone marrow transplant.\par 4. Osteoarthritis.\par 5. Thyroid nodules, subclinical hypothyroidism.\par 6.  Pulmonary nodule of unclear significance.\par 7.  Hyperlipidemia.\par 8. Status post total abdominal hysterectomy.\par 9. Allergic to Augmentin.\par \par Plan:\par 1.  Interim medical records, including latest labs, reviewed.\par 2.  Continue Omeprazole 40 mg daily for now.\par 3.  Schedule repeat EGD ASAP--Procedure, rationale, and anesthesia plan were reviewed and brochure given.  She is aware of the need to undergo COVID-19 nasopharyngeal swab testing within 5 days prior.\par 4.  Await results of upcoming chest CT.\par 5.  Other recommendations to follow.\par

## 2022-04-21 NOTE — REASON FOR VISIT
[Follow-Up: _____] : a [unfilled] follow-up visit [Spouse] : spouse [FreeTextEntry1] : Possible reflux returning

## 2022-04-21 NOTE — PHYSICAL EXAM
[General Appearance - Alert] : alert [General Appearance - In No Acute Distress] : in no acute distress [General Appearance - Well Developed] : well developed [Sclera] : the sclera and conjunctiva were normal [Neck Appearance] : the appearance of the neck was normal [Neck Cervical Mass (___cm)] : no neck mass was observed [Jugular Venous Distention Increased] : there was no jugular-venous distention [Auscultation Breath Sounds / Voice Sounds] : lungs were clear to auscultation bilaterally [Heart Rate And Rhythm] : heart rate was normal and rhythm regular [Heart Sounds] : normal S1 and S2 [Heart Sounds Gallop] : no gallops [Murmurs] : no murmurs [Heart Sounds Pericardial Friction Rub] : no pericardial rub [Full Pulse] : the pedal pulses are present [Edema] : there was no peripheral edema [Bowel Sounds] : normal bowel sounds [Abdomen Soft] : soft [Abdomen Tenderness] : non-tender [Abdomen Mass (___ Cm)] : no abdominal mass palpated [Abdomen Hernia] : no hernia was discovered [Normal Sphincter Tone] : normal sphincter tone [No Rectal Mass] : no rectal mass [Internal Hemorrhoid] : internal hemorrhoids [Cervical Lymph Nodes Enlarged Posterior Bilaterally] : posterior cervical [Cervical Lymph Nodes Enlarged Anterior Bilaterally] : anterior cervical [Supraclavicular Lymph Nodes Enlarged Bilaterally] : supraclavicular [Inguinal Lymph Nodes Enlarged Bilaterally] : inguinal [Nail Clubbing] : no clubbing  or cyanosis of the fingernails [Skin Color & Pigmentation] : normal skin color and pigmentation [Musculoskeletal - Swelling] : no joint swelling seen [Skin Turgor] : normal skin turgor [] : no rash [Oriented To Time, Place, And Person] : oriented to person, place, and time [Impaired Insight] : insight and judgment were intact [Affect] : the affect was normal [External Hemorrhoid] : no external hemorrhoids [Occult Blood Positive] : stool was negative for occult blood [FreeTextEntry1] : FIT negative

## 2022-04-22 LAB — SARS-COV-2 N GENE NPH QL NAA+PROBE: NOT DETECTED

## 2022-04-25 ENCOUNTER — LABORATORY RESULT (OUTPATIENT)
Age: 72
End: 2022-04-25

## 2022-04-25 ENCOUNTER — APPOINTMENT (OUTPATIENT)
Dept: GASTROENTEROLOGY | Facility: CLINIC | Age: 72
End: 2022-04-25
Payer: MEDICARE

## 2022-04-25 PROCEDURE — 43239 EGD BIOPSY SINGLE/MULTIPLE: CPT

## 2022-05-03 ENCOUNTER — NON-APPOINTMENT (OUTPATIENT)
Age: 72
End: 2022-05-03

## 2022-06-27 ENCOUNTER — APPOINTMENT (OUTPATIENT)
Dept: ENDOCRINOLOGY | Facility: CLINIC | Age: 72
End: 2022-06-27
Payer: MEDICARE

## 2022-06-27 VITALS
DIASTOLIC BLOOD PRESSURE: 76 MMHG | HEIGHT: 61 IN | BODY MASS INDEX: 19.63 KG/M2 | TEMPERATURE: 96 F | SYSTOLIC BLOOD PRESSURE: 142 MMHG | HEART RATE: 80 BPM | OXYGEN SATURATION: 99 % | WEIGHT: 104 LBS

## 2022-06-27 DIAGNOSIS — E03.9 HYPOTHYROIDISM, UNSPECIFIED: ICD-10-CM

## 2022-06-27 PROCEDURE — 36415 COLL VENOUS BLD VENIPUNCTURE: CPT

## 2022-06-27 PROCEDURE — 99213 OFFICE O/P EST LOW 20 MIN: CPT | Mod: 25

## 2022-06-27 NOTE — PHYSICAL EXAM
[Alert] : alert [Well Nourished] : well nourished [Healthy Appearance] : healthy appearance [No Acute Distress] : no acute distress [Well Developed] : well developed [Normal Voice/Communication] : normal voice communication [Normal Sclera/Conjunctiva] : normal sclera/conjunctiva [No Neck Mass] : no neck mass was observed [No LAD] : no lymphadenopathy [Supple] : the neck was supple [Thyroid Not Enlarged] : the thyroid was not enlarged [No Thyroid Nodules] : no palpable thyroid nodules [No Respiratory Distress] : no respiratory distress [No Stigmata of Cushings Syndrome] : no stigmata of Cushings Syndrome [Normal Gait] : normal gait [No Clubbing, Cyanosis] : no clubbing  or cyanosis of the fingernails [No Involuntary Movements] : no involuntary movements were seen [Acanthosis Nigricans] : no acanthosis nigricans [No Tremors] : no tremors [Normal Affect] : the affect was normal [Normal Insight/Judgement] : insight and judgment were intact [Normal Mood] : the mood was normal

## 2022-06-27 NOTE — HISTORY OF PRESENT ILLNESS
[FreeTextEntry1] : CC: Thyroid check\par This is a 71-year-old female with history of non-Hodgkin's lymphoma status post stem cell transplant/RT to head and neck, and chemotherapy, subclinical hypothyroidism, thyroid nodules, osteoporosis, vitamin D insufficiency, bronchiectasis, Felder's esophagus, here for follow-up.\par She is not currently on thyroid hormone replacement.\par Thyroid antibodies are negative.\par There is no family history of thyroid disease.

## 2022-06-27 NOTE — ASSESSMENT
[FreeTextEntry1] : This is a 71 year-old female with history of non-Hodgkin's lymphoma status post stem cell transplant/RT to head and neck, and chemotherapy, osteoporosis, subclinical hypothyroidism, thyroid nodules, vitamin D insufficiency, here for follow-up.  \par She is not currently on thyroid hormone replacement.\par Thyroid antibodies are negative.\par She is clinically euthyroid.\par Check TFTs.  \par Thyroid ultrasound from October 2021 showed right 1 cm thyroid nodule and left subcentimeter cysts.  Check thyroid ultrasound in October 2022.\par She refuses treatment for osteoporosis at this time.  Understands risk of fracture.  Check 25 vitamin D.\par

## 2022-06-28 LAB
25(OH)D3 SERPL-MCNC: 36.3 NG/ML
T4 FREE SERPL-MCNC: 0.9 NG/DL
TSH SERPL-ACNC: 5.57 UIU/ML

## 2022-06-30 ENCOUNTER — NON-APPOINTMENT (OUTPATIENT)
Age: 72
End: 2022-06-30

## 2022-08-23 ENCOUNTER — APPOINTMENT (OUTPATIENT)
Dept: GASTROENTEROLOGY | Facility: CLINIC | Age: 72
End: 2022-08-23

## 2022-08-25 ENCOUNTER — APPOINTMENT (OUTPATIENT)
Dept: GASTROENTEROLOGY | Facility: CLINIC | Age: 72
End: 2022-08-25

## 2022-08-25 VITALS
HEART RATE: 79 BPM | SYSTOLIC BLOOD PRESSURE: 148 MMHG | WEIGHT: 102 LBS | BODY MASS INDEX: 19.26 KG/M2 | DIASTOLIC BLOOD PRESSURE: 72 MMHG | HEIGHT: 61 IN

## 2022-08-25 DIAGNOSIS — U07.1 COVID-19: ICD-10-CM

## 2022-08-25 DIAGNOSIS — G57.91 UNSPECIFIED MONONEUROPATHY OF RIGHT LOWER LIMB: ICD-10-CM

## 2022-08-25 PROCEDURE — 99214 OFFICE O/P EST MOD 30 MIN: CPT

## 2022-08-25 RX ORDER — AZITHROMYCIN 250 MG/1
250 TABLET, FILM COATED ORAL
Qty: 6 | Refills: 0 | Status: DISCONTINUED | COMMUNITY
Start: 2022-03-02

## 2022-08-25 RX ORDER — NIRMATRELVIR AND RITONAVIR 300-100 MG
20 X 150 MG & KIT ORAL
Qty: 30 | Refills: 0 | Status: DISCONTINUED | COMMUNITY
Start: 2022-07-27

## 2022-08-25 RX ORDER — BENZONATATE 200 MG/1
200 CAPSULE ORAL
Qty: 20 | Refills: 0 | Status: DISCONTINUED | COMMUNITY
Start: 2022-03-02

## 2022-08-25 NOTE — HISTORY OF PRESENT ILLNESS
[FreeTextEntry1] : Rosalba voices frustration regarding her inability to gain weight.  She has done a lot of research regarding what foods she should avoid, given her history of GERD and recently diagnosed short segment Felder's (EGD April 2022).  She has been taking omeprazole 40 mg daily for quite some time, with good control of her reflux.  She had COVID-19 infection several weeks ago, finally feeling back to baseline over the past week.  She is followed by Dr. Dixon because of small pulmonary nodule.

## 2022-08-25 NOTE — REASON FOR VISIT
[Follow-Up: _____] : a [unfilled] follow-up visit [Spouse] : spouse [FreeTextEntry1] : Pt had endoscopy couple of months ago - pt has barretts - pt loosing weight

## 2022-08-25 NOTE — ASSESSMENT
[FreeTextEntry1] : 1. GERD, episodic dysphagia; history of duodenal ulcer, with short segment Felder's and hiatal hernia at repeat EGD April 2022.    \par 2. Diverticulosis, hemorrhoids, presumed adhesions at repeat colonoscopy November 2019. \par 3. History of non-Hodgkin's lymphoma in 1996, status post chemotherapy and bone marrow transplant.\par 4. Osteoarthritis.\par 5. Thyroid nodules, subclinical hypothyroidism.\par 6.  Pulmonary nodule of unclear significance.  Status post COVID-19 July 2022.\par 7.  Hyperlipidemia.\par 8.  Neuropathy right foot.\par 9.  Status post total abdominal hysterectomy.\par 10.  Allergic to Augmentin.\par \par Plan:\par 1.  Interim medical records, including latest labs, reviewed.\par 2.  Continue Omeprazole 40 mg daily indefinitely.\par 3.  Lengthy discussion regarding diagnosis, prognosis, treatment of Felder's esophagus ensued and [facility] brochure was given.\par 4.  Dietary instruction given.\par 5.  Return here next spring, for repeat EGD soon thereafter.

## 2022-08-25 NOTE — PHYSICAL EXAM
[General Appearance - Alert] : alert [General Appearance - In No Acute Distress] : in no acute distress [General Appearance - Well Developed] : well developed [Sclera] : the sclera and conjunctiva were normal [Neck Appearance] : the appearance of the neck was normal [Neck Cervical Mass (___cm)] : no neck mass was observed [Jugular Venous Distention Increased] : there was no jugular-venous distention [Auscultation Breath Sounds / Voice Sounds] : lungs were clear to auscultation bilaterally [Heart Rate And Rhythm] : heart rate was normal and rhythm regular [Heart Sounds] : normal S1 and S2 [Heart Sounds Gallop] : no gallops [Murmurs] : no murmurs [Heart Sounds Pericardial Friction Rub] : no pericardial rub [Full Pulse] : the pedal pulses are present [Edema] : there was no peripheral edema [Bowel Sounds] : normal bowel sounds [Abdomen Soft] : soft [Abdomen Tenderness] : non-tender [Abdomen Mass (___ Cm)] : no abdominal mass palpated [Abdomen Hernia] : no hernia was discovered [Skin Color & Pigmentation] : normal skin color and pigmentation [Skin Turgor] : normal skin turgor [] : no rash [Oriented To Time, Place, And Person] : oriented to person, place, and time [Impaired Insight] : insight and judgment were intact [Affect] : the affect was normal [FreeTextEntry1] : surgical scar

## 2022-10-27 ENCOUNTER — OUTPATIENT (OUTPATIENT)
Dept: OUTPATIENT SERVICES | Facility: HOSPITAL | Age: 72
LOS: 1 days | End: 2022-10-27
Payer: MEDICARE

## 2022-10-27 ENCOUNTER — APPOINTMENT (OUTPATIENT)
Dept: ULTRASOUND IMAGING | Facility: IMAGING CENTER | Age: 72
End: 2022-10-27

## 2022-10-27 DIAGNOSIS — E04.2 NONTOXIC MULTINODULAR GOITER: ICD-10-CM

## 2022-10-27 PROCEDURE — 76536 US EXAM OF HEAD AND NECK: CPT

## 2022-10-27 PROCEDURE — 76536 US EXAM OF HEAD AND NECK: CPT | Mod: 26

## 2022-11-03 ENCOUNTER — NON-APPOINTMENT (OUTPATIENT)
Age: 72
End: 2022-11-03

## 2022-11-06 ENCOUNTER — EMERGENCY (EMERGENCY)
Facility: HOSPITAL | Age: 72
LOS: 1 days | Discharge: HOME | End: 2022-11-06
Attending: EMERGENCY MEDICINE
Payer: MEDICARE

## 2022-11-06 VITALS
HEIGHT: 72 IN | WEIGHT: 104.94 LBS | SYSTOLIC BLOOD PRESSURE: 143 MMHG | OXYGEN SATURATION: 96 % | DIASTOLIC BLOOD PRESSURE: 80 MMHG | RESPIRATION RATE: 24 BRPM | TEMPERATURE: 100 F | HEART RATE: 123 BPM

## 2022-11-06 VITALS
RESPIRATION RATE: 18 BRPM | TEMPERATURE: 99 F | SYSTOLIC BLOOD PRESSURE: 127 MMHG | HEART RATE: 90 BPM | DIASTOLIC BLOOD PRESSURE: 80 MMHG | OXYGEN SATURATION: 97 %

## 2022-11-06 LAB
ALBUMIN SERPL ELPH-MCNC: 3.1 G/DL — LOW (ref 3.3–5)
ALP SERPL-CCNC: 179 U/L — HIGH (ref 40–120)
ALT FLD-CCNC: 74 U/L — HIGH (ref 10–45)
ANION GAP SERPL CALC-SCNC: 15 MMOL/L — SIGNIFICANT CHANGE UP (ref 5–17)
AST SERPL-CCNC: 64 U/L — HIGH (ref 10–40)
BASE EXCESS BLDV CALC-SCNC: 2.2 MMOL/L — SIGNIFICANT CHANGE UP (ref -2–3)
BASOPHILS # BLD AUTO: 0.02 K/UL — SIGNIFICANT CHANGE UP (ref 0–0.2)
BASOPHILS NFR BLD AUTO: 0.1 % — SIGNIFICANT CHANGE UP (ref 0–2)
BILIRUB SERPL-MCNC: 0.6 MG/DL — SIGNIFICANT CHANGE UP (ref 0.2–1.2)
BUN SERPL-MCNC: 11 MG/DL — SIGNIFICANT CHANGE UP (ref 7–23)
CA-I SERPL-SCNC: 1.1 MMOL/L — LOW (ref 1.15–1.33)
CALCIUM SERPL-MCNC: 8.6 MG/DL — SIGNIFICANT CHANGE UP (ref 8.4–10.5)
CHLORIDE BLDV-SCNC: 101 MMOL/L — SIGNIFICANT CHANGE UP (ref 96–108)
CHLORIDE SERPL-SCNC: 98 MMOL/L — SIGNIFICANT CHANGE UP (ref 96–108)
CO2 BLDV-SCNC: 28 MMOL/L — HIGH (ref 22–26)
CO2 SERPL-SCNC: 26 MMOL/L — SIGNIFICANT CHANGE UP (ref 22–31)
CREAT SERPL-MCNC: 0.85 MG/DL — SIGNIFICANT CHANGE UP (ref 0.5–1.3)
EGFR: 73 ML/MIN/1.73M2 — SIGNIFICANT CHANGE UP
EOSINOPHIL # BLD AUTO: 0.01 K/UL — SIGNIFICANT CHANGE UP (ref 0–0.5)
EOSINOPHIL NFR BLD AUTO: 0.1 % — SIGNIFICANT CHANGE UP (ref 0–6)
GAS PNL BLDV: 133 MMOL/L — LOW (ref 136–145)
GAS PNL BLDV: SIGNIFICANT CHANGE UP
GAS PNL BLDV: SIGNIFICANT CHANGE UP
GLUCOSE BLDV-MCNC: 110 MG/DL — HIGH (ref 70–99)
GLUCOSE SERPL-MCNC: 112 MG/DL — HIGH (ref 70–99)
HCO3 BLDV-SCNC: 26 MMOL/L — SIGNIFICANT CHANGE UP (ref 22–29)
HCT VFR BLD CALC: 28.4 % — LOW (ref 34.5–45)
HCT VFR BLDA CALC: 24 % — LOW (ref 34.5–46.5)
HGB BLD CALC-MCNC: 8.1 G/DL — LOW (ref 11.7–16.1)
HGB BLD-MCNC: 9.6 G/DL — LOW (ref 11.5–15.5)
IMM GRANULOCYTES NFR BLD AUTO: 1.4 % — HIGH (ref 0–0.9)
LACTATE BLDV-MCNC: 2.5 MMOL/L — HIGH (ref 0.5–2)
LYMPHOCYTES # BLD AUTO: 0.79 K/UL — LOW (ref 1–3.3)
LYMPHOCYTES # BLD AUTO: 4.6 % — LOW (ref 13–44)
MAGNESIUM SERPL-MCNC: 1.6 MG/DL — SIGNIFICANT CHANGE UP (ref 1.6–2.6)
MCHC RBC-ENTMCNC: 30.6 PG — SIGNIFICANT CHANGE UP (ref 27–34)
MCHC RBC-ENTMCNC: 33.8 GM/DL — SIGNIFICANT CHANGE UP (ref 32–36)
MCV RBC AUTO: 90.4 FL — SIGNIFICANT CHANGE UP (ref 80–100)
MONOCYTES # BLD AUTO: 0.86 K/UL — SIGNIFICANT CHANGE UP (ref 0–0.9)
MONOCYTES NFR BLD AUTO: 5 % — SIGNIFICANT CHANGE UP (ref 2–14)
NEUTROPHILS # BLD AUTO: 15.39 K/UL — HIGH (ref 1.8–7.4)
NEUTROPHILS NFR BLD AUTO: 88.8 % — HIGH (ref 43–77)
NRBC # BLD: 0 /100 WBCS — SIGNIFICANT CHANGE UP (ref 0–0)
PCO2 BLDV: 39 MMHG — SIGNIFICANT CHANGE UP (ref 39–42)
PH BLDV: 7.44 — HIGH (ref 7.32–7.43)
PLATELET # BLD AUTO: 341 K/UL — SIGNIFICANT CHANGE UP (ref 150–400)
PO2 BLDV: 23 MMHG — LOW (ref 25–45)
POTASSIUM BLDV-SCNC: 3.8 MMOL/L — SIGNIFICANT CHANGE UP (ref 3.5–5.1)
POTASSIUM SERPL-MCNC: 3.9 MMOL/L — SIGNIFICANT CHANGE UP (ref 3.5–5.3)
POTASSIUM SERPL-SCNC: 3.9 MMOL/L — SIGNIFICANT CHANGE UP (ref 3.5–5.3)
PROT SERPL-MCNC: 7.4 G/DL — SIGNIFICANT CHANGE UP (ref 6–8.3)
RAPID RVP RESULT: SIGNIFICANT CHANGE UP
RBC # BLD: 3.14 M/UL — LOW (ref 3.8–5.2)
RBC # FLD: 15 % — HIGH (ref 10.3–14.5)
SAO2 % BLDV: 30.7 % — LOW (ref 67–88)
SARS-COV-2 RNA SPEC QL NAA+PROBE: SIGNIFICANT CHANGE UP
SODIUM SERPL-SCNC: 139 MMOL/L — SIGNIFICANT CHANGE UP (ref 135–145)
WBC # BLD: 17.32 K/UL — HIGH (ref 3.8–10.5)
WBC # FLD AUTO: 17.32 K/UL — HIGH (ref 3.8–10.5)

## 2022-11-06 PROCEDURE — 84132 ASSAY OF SERUM POTASSIUM: CPT

## 2022-11-06 PROCEDURE — 83605 ASSAY OF LACTIC ACID: CPT

## 2022-11-06 PROCEDURE — 80053 COMPREHEN METABOLIC PANEL: CPT

## 2022-11-06 PROCEDURE — 84295 ASSAY OF SERUM SODIUM: CPT

## 2022-11-06 PROCEDURE — 82803 BLOOD GASES ANY COMBINATION: CPT

## 2022-11-06 PROCEDURE — 99285 EMERGENCY DEPT VISIT HI MDM: CPT

## 2022-11-06 PROCEDURE — 85018 HEMOGLOBIN: CPT

## 2022-11-06 PROCEDURE — 99285 EMERGENCY DEPT VISIT HI MDM: CPT | Mod: 25

## 2022-11-06 PROCEDURE — 36415 COLL VENOUS BLD VENIPUNCTURE: CPT

## 2022-11-06 PROCEDURE — 85025 COMPLETE CBC W/AUTO DIFF WBC: CPT

## 2022-11-06 PROCEDURE — 85014 HEMATOCRIT: CPT

## 2022-11-06 PROCEDURE — 83735 ASSAY OF MAGNESIUM: CPT

## 2022-11-06 PROCEDURE — 71046 X-RAY EXAM CHEST 2 VIEWS: CPT

## 2022-11-06 PROCEDURE — 82330 ASSAY OF CALCIUM: CPT

## 2022-11-06 PROCEDURE — 82435 ASSAY OF BLOOD CHLORIDE: CPT

## 2022-11-06 PROCEDURE — 71046 X-RAY EXAM CHEST 2 VIEWS: CPT | Mod: 26

## 2022-11-06 PROCEDURE — 82947 ASSAY GLUCOSE BLOOD QUANT: CPT

## 2022-11-06 PROCEDURE — 0225U NFCT DS DNA&RNA 21 SARSCOV2: CPT

## 2022-11-06 RX ORDER — CIPROFLOXACIN LACTATE 400MG/40ML
1 VIAL (ML) INTRAVENOUS
Qty: 5 | Refills: 0
Start: 2022-11-06 | End: 2022-11-10

## 2022-11-06 RX ORDER — SODIUM CHLORIDE 9 MG/ML
1000 INJECTION INTRAMUSCULAR; INTRAVENOUS; SUBCUTANEOUS ONCE
Refills: 0 | Status: COMPLETED | OUTPATIENT
Start: 2022-11-06 | End: 2022-11-06

## 2022-11-06 RX ORDER — EPINEPHRINE 0.3 MG/.3ML
1 INJECTION INTRAMUSCULAR; SUBCUTANEOUS
Qty: 1 | Refills: 0
Start: 2022-11-06

## 2022-11-06 RX ORDER — ACETAMINOPHEN 500 MG
975 TABLET ORAL ONCE
Refills: 0 | Status: COMPLETED | OUTPATIENT
Start: 2022-11-06 | End: 2022-11-06

## 2022-11-06 RX ORDER — AZITHROMYCIN 500 MG/1
500 TABLET, FILM COATED ORAL ONCE
Refills: 0 | Status: COMPLETED | OUTPATIENT
Start: 2022-11-06 | End: 2022-11-06

## 2022-11-06 RX ADMIN — AZITHROMYCIN 500 MILLIGRAM(S): 500 TABLET, FILM COATED ORAL at 16:10

## 2022-11-06 RX ADMIN — Medication 1 TABLET(S): at 16:22

## 2022-11-06 RX ADMIN — Medication 975 MILLIGRAM(S): at 14:59

## 2022-11-06 RX ADMIN — SODIUM CHLORIDE 1000 MILLILITER(S): 9 INJECTION INTRAMUSCULAR; INTRAVENOUS; SUBCUTANEOUS at 14:59

## 2022-11-06 RX ADMIN — Medication 975 MILLIGRAM(S): at 15:22

## 2022-11-06 RX ADMIN — SODIUM CHLORIDE 1000 MILLILITER(S): 9 INJECTION INTRAMUSCULAR; INTRAVENOUS; SUBCUTANEOUS at 16:00

## 2022-11-06 NOTE — ED PROVIDER NOTE - PATIENT PORTAL LINK FT
You can access the FollowMyHealth Patient Portal offered by [facility] by registering at the following website: [email or web address]. By joining [employer]’s FollowMyHealth portal, you will also be able to view your health information using other applications (apps) compatible with our system.

## 2022-11-06 NOTE — ED PROVIDER NOTE - PHYSICAL EXAMINATION
Subjective   Patient ID: Sherley is a 23 year old female who presents today for prenatal visit.  She is of 38w6d gestation.    positive fetal movement, No bleeding, No rupture of membranes, No uterine contractions  VE: 1/80/-3  ASSESSMENT:  Pregnancy at 38w6d weeks gestation.      PLAN:  Follow up visit in 1 weeks  Kick counts labor precautions discussediscussed      
Const: Well-nourished, Well-developed, appearing stated age.  Eyes: no conjunctival injection, and symmetrical lids.  HEENT: Head NCAT, no lesions. Atraumatic external nose and ears. Moist MM.  Neck: Symmetric, trachea midline.   CVS: +S1/S2,   RESP: Unlabored respiratory effort. crackles b/l lower lobes  GI: Nontender/Nondistended, No CVA tenderness b/l.   MSK: Normocephalic/Atraumatic, Lower Extremities w/o calf tenderness or edema b/l.   Skin: Warm, dry and intact.   Neuro: CNs II-XII grossly intact. Motor & Sensation grossly intact.  Psych: Awake, Alert, & Oriented (AAO) x3. Appropriate mood and affect.

## 2022-11-06 NOTE — ED PROVIDER NOTE - PROGRESS NOTE DETAILS
Jorge Lopez, PGY2  Pt states she was allergic to penicillin originally. so pt was given azithro and augementin. Pt later states she was allergic to Augmentin. WIll observe for any reactions and switch meds. Jorge Lopez, PGY2 Pt ambulated in room and spod %. Pt denies any sob, rashes, or wheezing. will dc with return precautions.

## 2022-11-06 NOTE — ED PROVIDER NOTE - NSFOLLOWUPINSTRUCTIONS_ED_ALL_ED_FT
Please  your antibiotic from the pharmacy and take it daily.   Please see your primary doctor in 2-3 days for follow-up care. Return to ER for any new or worsening symptoms including trouble breathing, chest pain, or dizziness.

## 2022-11-06 NOTE — ED ADULT NURSE REASSESSMENT NOTE - NS ED NURSE REASSESS COMMENT FT1
Patient given medication as per provider order. Patient was asked if any allergies were present and patient denied. Patient given medication. After administration patient admits to allergies to Augmentin. MD Lopez made aware. Patient remains on cardiac monitor and . Patient given supplemental O2 as per provider order. Patient made aware of allergy signs and symptoms. Bed at lowest position. No distress stated.

## 2022-11-06 NOTE — ED PROVIDER NOTE - OBJECTIVE STATEMENT
71y F PMHx anxiety, non-hodgkin's lymphoma (20+ yrs ago) p/w 1 wk of productive cough, fever 101 max, chills, weakness and myalgias with sob when walking up stairs. No cp, vomiting, dizziness, syncope, dysuria, or diarrhea. Pt went to [facility] and found to have b/l pna. 71y F PMHx anxiety, non-hodgkin's lymphoma (20+ yrs ago) p/w 1 wk of productive cough, fever 101 max, chills, weakness and myalgias with sob when walking up stairs. No cp, vomiting, dizziness, syncope, dysuria, or diarrhea. [facility].    Attendinyo female presents with cough and URI symptoms for about 10 days.  fever last few days.  [facility] and diagnosed with 'double pneumonia' today and sent to ED.  no hypoxia.  no vomiting.  no chest pain

## 2022-11-06 NOTE — ED ADULT NURSE NOTE - OBJECTIVE STATEMENT
AAOx3 c/o pneumonia as per provider at [facility]. Patient denies resp distress. C/o SOB. Denies CP, NVD, HA or dizziness. On cardiac monitor and .

## 2022-11-06 NOTE — ED PROVIDER NOTE - IV ALTEPLASE EXCL ABS HIDDEN
[facility] Hospitalist Progress Note    SUBJECTIVE:    Patient feels better appetite, less abd pain and less loose stool episodes     OBJECTIVE:    Current Facility-Administered Medications   Medication   • potassium phosphate 45 mmol in sodium chloride 0.9 % 500 mL IVPB   • sodium chloride (PF) 0.9 % injection 10 mL   • sodium chloride (PF) 0.9 % injection 10 mL   • pantoprazole (PROTONIX INJECT) injection 40 mg   • sucralfate (CARAFATE) 1 GM/10ML suspension 1 g   • vancomycin (VANCOCIN) 50 MG/ML (compounded) solution 250 mg   • sodium chloride 0.9% infusion   • buPROPion XL (WELLBUTRIN XL) tablet 300 mg   • carbidopa-levodopa (SINEMET)  MG per tablet 2 tablet   • carbidopa-levodopa (SINEMET)  MG per tablet 3 tablet   • DULoxetine (CYMBALTA) capsule 60 mg   • ketotifen (ZADITOR) 0.025 % ophthalmic solution 1 drop   • rosuvastatin (CRESTOR) tablet 10 mg   • metroNIDAZOLE (FLAGYL) IVPB 500 mg   • ondansetron (ZOFRAN) injection 4 mg   • sodium chloride 0.9 % flush bag 25 mL   • sodium chloride (PF) 0.9 % injection 2 mL   • sodium chloride (NORMAL SALINE) 0.9 % bolus 500 mL   • enoxaparin (LOVENOX) injection 30 mg       I/O's    Intake/Output Summary (Last 24 hours) at 7/7/2021 1656  Last data filed at 7/7/2021 1515  Gross per 24 hour   Intake 1832.96 ml   Output 450 ml   Net 1382.96 ml       Last Recorded Vitals  Vital Last Value 24 Hour Range   Temperature 97.5 °F (36.4 °C) (07/07/21 1551) Temp  Min: 97.5 °F (36.4 °C)  Max: 98.4 °F (36.9 °C)   Pulse 90 (07/07/21 1551) Pulse  Min: 86  Max: 95   Respiratory 18 (07/07/21 1551) Resp  Min: 12  Max: 18   Non-Invasive  Blood Pressure 113/67 (07/07/21 1551) BP  Min: 104/54  Max: 127/69   Pulse Oximetry 99 % (07/07/21 1551) SpO2  Min: 92 %  Max: 99 %     Vital Today Admitted   Weight 47.1 kg (103 lb 13.4 oz) (07/05/21 2100) Weight: 45.5 kg (100 lb 5 oz) (07/05/21 0815)   Height N/A Height: 5' (152.4 cm) (07/05/21 0815)   BMI N/A BMI (Calculated): 19.59 (07/05/21 0815)        Physical Exam  Constitutional:       Appearance: She is ill-appearing.   HENT:      Head: Normocephalic.      Mouth/Throat:      Mouth: Mucous membranes are moist.   Eyes:      Pupils: Pupils are equal, round, and reactive to light.   Cardiovascular:      Rate and Rhythm: Normal rate and regular rhythm.   Pulmonary:      Effort: Pulmonary effort is normal.      Breath sounds: Normal breath sounds.   Abdominal:      General: Bowel sounds are normal.      Palpations: Abdomen is soft.      Tenderness: There is abdominal tenderness (right side less tenderness). There is no rebound.   Musculoskeletal:         General: No swelling.   Skin:     General: Skin is dry.      Capillary Refill: Capillary refill takes less than 2 seconds.   Neurological:      General: No focal deficit present.      Mental Status: She is alert and oriented to person, place, and time.   Psychiatric:         Mood and Affect: Mood normal.        Labs     Recent Labs   Lab 07/07/21  0435 07/06/21  0510 07/05/21  0816   WBC 19.9* 24.0* 18.0*   HCT 30.5* 32.9* 41.3   HGB 10.0* 10.4* 13.2    243 297   SODIUM 140 139 133*   POTASSIUM 2.8* 3.7 4.2   CHLORIDE 108* 108* 100   CO2 23 22 27   CALCIUM 8.2* 8.3* 10.1   GLUCOSE 95 88 97   BUN 11 12 22*   CREATININE 0.47* 0.56 0.60   AST  --   --  18   GPT  --   --  10   ALKPT  --   --  80   BILIRUBIN  --   --  0.7   ALBUMIN  --   --  3.8   LIPA  --   --  380   PHOS 1.1* 2.6  --        Imaging  No results found.    ASSESSMENT/PLAN:    Abdominal pain, nausea, vomiting and diarrhea, likely due to C. Diff colitis, with possible gastroenteritis, in pt with hx of C diff colitis and gastroparesis, possibly related to Parkinson's   - C diff (7/05) detected   - Bacterial stool cx in process   - Stool positive for WBC  - Camplyobacteria EIA cx in process  - Shiga toxin EIA cx in process  - CTA A/P (7/5) noted mucosal thickening of the transverse and descending colon compatible w/ a nonspecific colitis;  non-obstructing punctate bilateral renal calculi; hiatal hernia  - Continue w/ IVF's (NS @ 40 ml/hr) and PRN Zofran. Sucralfate added.  - Diet: Full liquid diet, advance as tolerated  - Maintain contact and special precautions   - ID and GI following      Sepsis, present on arrival, likely reactive to above  - Leukocytosis (WBC 18.0 --> 24 --> 19.9 today). VS stable  - Blood cx x2, NGTD. Following  - EKG (7/5) showed sinus tachycardia   - Empiric abx coverage w/ IV Metronidazole and PO Vancomycin  - Dc IV Ceftriaxone    - Monitor w/ telemetry      Pernicious anemia  - 13.4 --> 10.4 --> 10.0 today. No acute bleed noted  - Monitor H&H, transfuse if Hgb <7    Hyponatremia, likely 2/2 GI losses - resolved    Hypokalemia and Hypophosphatemia  - K 2.8, Phos 1.1  - Repalce with KCL and Kphos riders     GERD with hiatal hernia - Continue on Pepcid   HLD - Continue on rosuvastatin   Osteoarthritis - On outpt Tylenol  Osteoporosis - On outpt cholecalciferol   Vitamin D deficiency - On outpt cholecalciferol   Carpal tunnel syndrome - On outpt Tylenol  Fibromyalgia - On outpt Tylenol  Parkinson's disease - Continue on Sinemet  Macular degeneration - Continue on home eye gtt's   Bipolar 1 disorder/depressive disorder NOS - Continue on Wellbutrin and Cymbalta      DVT PPx: SCD's and Lovenox    DISPO: Pending clinical improvement and specialist input, will need long taper PO Vanc on discharge, discharge time unclear    PCP:  Carly Crowe DO         show

## 2022-11-06 NOTE — ED PROVIDER NOTE - CLINICAL SUMMARY MEDICAL DECISION MAKING FREE TEXT BOX
71y F PMHx anxiety, non-hodgkin's lymphoma (20+ yrs ago) p/w 1 wk of productive cough r/o pna. ECG wnl.

## 2022-11-10 ENCOUNTER — TRANSCRIPTION ENCOUNTER (OUTPATIENT)
Age: 72
End: 2022-11-10

## 2022-11-11 ENCOUNTER — TRANSCRIPTION ENCOUNTER (OUTPATIENT)
Age: 72
End: 2022-11-11

## 2022-12-21 ENCOUNTER — APPOINTMENT (OUTPATIENT)
Dept: ENDOCRINOLOGY | Facility: CLINIC | Age: 72
End: 2022-12-21

## 2022-12-21 VITALS
HEART RATE: 97 BPM | SYSTOLIC BLOOD PRESSURE: 105 MMHG | BODY MASS INDEX: 19.41 KG/M2 | HEIGHT: 60.43 IN | DIASTOLIC BLOOD PRESSURE: 65 MMHG | WEIGHT: 100.18 LBS | TEMPERATURE: 96.4 F | OXYGEN SATURATION: 96 %

## 2022-12-21 PROCEDURE — 36415 COLL VENOUS BLD VENIPUNCTURE: CPT

## 2022-12-21 PROCEDURE — 99213 OFFICE O/P EST LOW 20 MIN: CPT | Mod: 25

## 2022-12-21 NOTE — PHYSICAL EXAM
[Alert] : alert [Well Nourished] : well nourished [Healthy Appearance] : healthy appearance [No Acute Distress] : no acute distress [Normal Voice/Communication] : normal voice communication [Normal Sclera/Conjunctiva] : normal sclera/conjunctiva [No Proptosis] : no proptosis [No Neck Mass] : no neck mass was observed [No LAD] : no lymphadenopathy [Supple] : the neck was supple [Thyroid Not Enlarged] : the thyroid was not enlarged [No Thyroid Nodules] : no palpable thyroid nodules [No Respiratory Distress] : no respiratory distress [No Stigmata of Cushings Syndrome] : no stigmata of Cushings Syndrome [Normal Affect] : the affect was normal [Normal Insight/Judgement] : insight and judgment were intact [Normal Mood] : the mood was normal

## 2022-12-21 NOTE — ADDENDUM
[FreeTextEntry1] : By signing my name below, I, Tera Ledbetter, attest that this documentation has been prepared under the direction and in the presence of Dr. Orona.\par \par I, Elinor Orona MD, personally performed the services described in this documentation. All medical record entries made by the scribe were at my direction and in my presence. I have reviewed the chart and discharge instructions (if applicable) and agree that the record reflects my personal performance and is accurate and complete.

## 2022-12-21 NOTE — HISTORY OF PRESENT ILLNESS
[FreeTextEntry1] : CC: Thyroid check\par \par This is a 71-year-old female with history of non-Hodgkin's lymphoma status post stem cell transplant/RT to head and neck, and chemotherapy, subclinical hypothyroidism, thyroid nodules, osteoporosis, vitamin D insufficiency, bronchiectasis, Felder's esophagus, here for follow-up.\par She is not currently on thyroid hormone replacement.\par Thyroid antibodies are negative.\par There is no family history of thyroid disease.\par She was diagnosed with pneumonia in November 2022. She was discharged with Levofloxacin and developed canker sores two days later. She was seen by her pulmonologist afterwards and was started on Azithromycin. She has been doing well otherwise.

## 2022-12-21 NOTE — ASSESSMENT
[FreeTextEntry1] : This is a 71 year-old female with history of non-Hodgkin's lymphoma status post stem cell transplant/RT to head and neck, and chemotherapy, osteoporosis, subclinical hypothyroidism, thyroid nodules, vitamin D insufficiency, here for follow-up. \par \par She is not currently on thyroid hormone replacement.\par Thyroid antibodies are negative.\par She is clinically euthyroid.\par Check TFTs.  We will consider starting treatment if TSH is 10 or greater.\par Check thyroid ultrasound in October 2023.\par She refuses treatment for osteoporosis at this time. Understands risk of fracture. Check 25 vitamin D.\par

## 2022-12-29 LAB
T4 FREE SERPL-MCNC: 0.8 NG/DL
THYROGLOB AB SERPL-ACNC: <20 IU/ML
THYROPEROXIDASE AB SERPL IA-ACNC: <10 IU/ML
TSH SERPL-ACNC: 6.17 UIU/ML

## 2023-01-03 ENCOUNTER — NON-APPOINTMENT (OUTPATIENT)
Age: 73
End: 2023-01-03

## 2023-01-03 RX ORDER — LEVOTHYROXINE SODIUM 0.03 MG/1
25 TABLET ORAL
Qty: 90 | Refills: 2 | Status: COMPLETED | COMMUNITY
Start: 2022-12-30 | End: 2023-09-26

## 2023-01-22 ENCOUNTER — TRANSCRIPTION ENCOUNTER (OUTPATIENT)
Age: 73
End: 2023-01-22

## 2023-01-24 ENCOUNTER — TRANSCRIPTION ENCOUNTER (OUTPATIENT)
Age: 73
End: 2023-01-24

## 2023-04-04 ENCOUNTER — APPOINTMENT (OUTPATIENT)
Dept: GASTROENTEROLOGY | Facility: CLINIC | Age: 73
End: 2023-04-04
Payer: MEDICARE

## 2023-04-04 VITALS
SYSTOLIC BLOOD PRESSURE: 132 MMHG | BODY MASS INDEX: 19.63 KG/M2 | HEART RATE: 98 BPM | DIASTOLIC BLOOD PRESSURE: 69 MMHG | WEIGHT: 100 LBS | HEIGHT: 60 IN

## 2023-04-04 DIAGNOSIS — J47.9 BRONCHIECTASIS, UNCOMPLICATED: ICD-10-CM

## 2023-04-04 DIAGNOSIS — K21.00 GASTRO-ESOPHAGEAL REFLUX DISEASE WITH ESOPHAGITIS, WITHOUT BLEEDING: ICD-10-CM

## 2023-04-04 DIAGNOSIS — K92.2 GASTROINTESTINAL HEMORRHAGE, UNSPECIFIED: ICD-10-CM

## 2023-04-04 PROCEDURE — 99214 OFFICE O/P EST MOD 30 MIN: CPT | Mod: 25

## 2023-04-04 PROCEDURE — 82274 ASSAY TEST FOR BLOOD FECAL: CPT | Mod: QW

## 2023-04-04 RX ORDER — OMEPRAZOLE 40 MG/1
40 CAPSULE, DELAYED RELEASE ORAL
Qty: 90 | Refills: 3 | Status: ACTIVE | COMMUNITY
Start: 2020-12-08 | End: 1900-01-01

## 2023-04-04 NOTE — HISTORY OF PRESENT ILLNESS
[FreeTextEntry1] : Rosalba had some episodes of spitting up blood, ultimately underwent bronchoscopy with washes on 3/27 (Dr. Dixon), found to have H. flu/bronchiectasis, now completing a course of Biaxin.  She has noted some black discoloration in portions of her stool, with some associated constipation, but that has since normalized.  She continues on omeprazole 40 mg daily because of history of GERD and short segment Felder esophagus diagnosed April 2022.

## 2023-04-04 NOTE — ASSESSMENT
[FreeTextEntry1] : 1. GERD, episodic dysphagia; history of duodenal ulcer, with short segment Felder's and hiatal hernia at repeat EGD April 2022--rule out smoldering inflammation, dysplasia, neoplasm.    \par 2. Diverticulosis, hemorrhoids, presumed adhesions at repeat colonoscopy November 2019.  Stool guaiac negative with negative fecal immunochemical test on today's exam.\par 3. Chronic cough, history of hemoptysis, double pneumonia, with bronchiectasis, H. flu at recent bronchoscopy.\par 4. History of non-Hodgkin's lymphoma in 1996, status post chemotherapy and bone marrow transplant.\par 5. Osteoarthritis.\par 6. Thyroid nodules, subclinical hypothyroidism.\par 7.  Hyperlipidemia.\par 8.  Neuropathy right foot.\par 9.  Status post total abdominal hysterectomy.\par 10.  Allergic to Augmentin, levofloxacin.\par \par Plan:\par 1.  Interim medical records, including latest labs, reviewed.\par 2.  Continue Omeprazole 40 mg daily indefinitely.\par 3.  Surveillance EGD is indicated.  However, given recent pulmonary issues, would hold off for now.  Consider later this spring, once pulmonary status has been optimized. Procedure, rationale, alternatives, material risks, and anesthesia plan were reviewed and brochure given.\par

## 2023-04-04 NOTE — CONSULT LETTER
[Dear  ___] : Dear  [unfilled], [Courtesy Letter:] : I had the pleasure of seeing your patient, [unfilled], in my office today. [Please see my note below.] : Please see my note below. [Consult Closing:] : Thank you very much for allowing me to participate in the care of this patient.  If you have any questions, please do not hesitate to contact me. [Sincerely,] : Sincerely, [FreeTextEntry3] : Francis Reyes M.D.\par  [DrRoslyn  ___] : Dr. ASHFORD

## 2023-04-04 NOTE — PHYSICAL EXAM
[Well Developed] : well developed [None] : no edema [Bowel Sounds] : normal bowel sounds [Abdomen Tenderness] : non-tender [No Masses] : no abdominal mass palpated [Abdomen Soft] : soft [] : no hepatosplenomegaly [No Hernia] : no hernia [No External Hemorrhoid] : no external hemorrhoids [Occult Blood] : negative occult blood [FIT Test] : negative FIT test [Inguinal Lymph Nodes Enlarged Bilaterally] : no inguinal lymphadenopathy [Normal Color / Pigmentation] : normal skin color and pigmentation [Normal] : oriented to person, place, and time [de-identified] : thin [de-identified] : crackles/rhonchi left lung base; nonproductive cough [de-identified] : surgical scar

## 2023-04-04 NOTE — REVIEW OF SYSTEMS
[Constipation] : constipation [Melena (black stool)] : melena [Negative] : Heme/Lymph [As Noted in HPI] : as noted in HPI

## 2023-05-08 LAB
T4 FREE SERPL-MCNC: 1 NG/DL
TSH SERPL-ACNC: 7.65 UIU/ML

## 2023-05-10 ENCOUNTER — NON-APPOINTMENT (OUTPATIENT)
Age: 73
End: 2023-05-10

## 2023-06-07 ENCOUNTER — LABORATORY RESULT (OUTPATIENT)
Age: 73
End: 2023-06-07

## 2023-06-07 ENCOUNTER — APPOINTMENT (OUTPATIENT)
Dept: GASTROENTEROLOGY | Facility: CLINIC | Age: 73
End: 2023-06-07
Payer: MEDICARE

## 2023-06-07 PROCEDURE — 43239 EGD BIOPSY SINGLE/MULTIPLE: CPT

## 2023-06-13 ENCOUNTER — NON-APPOINTMENT (OUTPATIENT)
Age: 73
End: 2023-06-13

## 2023-06-14 ENCOUNTER — NON-APPOINTMENT (OUTPATIENT)
Age: 73
End: 2023-06-14

## 2023-10-01 PROBLEM — Z92.3 HISTORY OF RADIATION THERAPY: Status: ACTIVE | Noted: 2021-02-05

## 2023-12-20 ENCOUNTER — APPOINTMENT (OUTPATIENT)
Dept: ENDOCRINOLOGY | Facility: CLINIC | Age: 73
End: 2023-12-20
Payer: MEDICARE

## 2023-12-20 VITALS
HEIGHT: 61 IN | OXYGEN SATURATION: 98 % | DIASTOLIC BLOOD PRESSURE: 68 MMHG | HEART RATE: 80 BPM | BODY MASS INDEX: 20.96 KG/M2 | SYSTOLIC BLOOD PRESSURE: 118 MMHG | WEIGHT: 111 LBS | TEMPERATURE: 97.7 F

## 2023-12-20 DIAGNOSIS — E03.8 OTHER SPECIFIED HYPOTHYROIDISM: ICD-10-CM

## 2023-12-20 DIAGNOSIS — E04.2 NONTOXIC MULTINODULAR GOITER: ICD-10-CM

## 2023-12-20 PROCEDURE — 36415 COLL VENOUS BLD VENIPUNCTURE: CPT

## 2023-12-20 PROCEDURE — 99213 OFFICE O/P EST LOW 20 MIN: CPT | Mod: 25

## 2023-12-26 PROBLEM — E03.8 SUBCLINICAL HYPOTHYROIDISM: Status: ACTIVE | Noted: 2021-12-30

## 2023-12-26 PROBLEM — E04.2 MULTIPLE THYROID NODULES: Status: ACTIVE | Noted: 2021-09-28

## 2023-12-26 LAB
25(OH)D3 SERPL-MCNC: 31.2 NG/ML
ALBUMIN SERPL ELPH-MCNC: 4.1 G/DL
ALP BLD-CCNC: 81 U/L
ALT SERPL-CCNC: 14 U/L
ANION GAP SERPL CALC-SCNC: 10 MMOL/L
AST SERPL-CCNC: 17 U/L
BILIRUB SERPL-MCNC: 0.2 MG/DL
BUN SERPL-MCNC: 18 MG/DL
CALCIUM SERPL-MCNC: 9.1 MG/DL
CHLORIDE SERPL-SCNC: 105 MMOL/L
CO2 SERPL-SCNC: 27 MMOL/L
CREAT SERPL-MCNC: 0.89 MG/DL
EGFR: 68 ML/MIN/1.73M2
GLUCOSE SERPL-MCNC: 94 MG/DL
POTASSIUM SERPL-SCNC: 4.3 MMOL/L
PROT SERPL-MCNC: 6.9 G/DL
SODIUM SERPL-SCNC: 142 MMOL/L
T4 FREE SERPL-MCNC: 0.9 NG/DL
TSH SERPL-ACNC: 5.72 UIU/ML

## 2023-12-26 NOTE — HISTORY OF PRESENT ILLNESS
[FreeTextEntry1] : CC: Thyroid check  This is a 72-year-old female with history of non-Hodgkin's lymphoma status post stem cell transplant/RT to head and neck, and chemotherapy, subclinical hypothyroidism, thyroid nodules, osteoporosis, vitamin D insufficiency, bronchiectasis, Felder's esophagus, here for follow-up. She is not currently on thyroid hormone replacement. Thyroid antibodies are negative. There is no family history of thyroid disease.

## 2023-12-26 NOTE — ADDENDUM
[FreeTextEntry1] : By signing my name below, I, Vlda Albert, attest that this document has been prepared under the direction and in the presence of Dr. Orona.  I, Elinor Orona MD, personally performed the services described in this documentation. All medical record entries made by the scribe were at my discretion and in my presence. I have reviewed the chart and discharge instructions (if applicable) and agree that the record reflects my personal permanence and is accurate and complete.

## 2023-12-26 NOTE — ASSESSMENT
[FreeTextEntry1] : This is a 72 year-old female with history of non-Hodgkin's lymphoma status post stem cell transplant/RT to head and neck, and chemotherapy, osteoporosis, subclinical hypothyroidism, thyroid nodules, vitamin D insufficiency, here for follow-up.  She is not currently on thyroid hormone replacement. Thyroid antibodies are negative. She is clinically euthyroid. Check TFTs. We will consider starting treatment if indicated. Check thyroid ultrasound. She refuses treatment for osteoporosis at this time. Understands risk of fracture. Check 25 vitamin D.

## 2023-12-27 ENCOUNTER — APPOINTMENT (OUTPATIENT)
Dept: GASTROENTEROLOGY | Facility: CLINIC | Age: 73
End: 2023-12-27
Payer: MEDICARE

## 2023-12-27 VITALS
HEART RATE: 72 BPM | DIASTOLIC BLOOD PRESSURE: 80 MMHG | SYSTOLIC BLOOD PRESSURE: 156 MMHG | HEIGHT: 61 IN | WEIGHT: 116.01 LBS | BODY MASS INDEX: 21.9 KG/M2

## 2023-12-27 DIAGNOSIS — K22.70 BARRETT'S ESOPHAGUS W/OUT DYSPLASIA: ICD-10-CM

## 2023-12-27 DIAGNOSIS — K31.7 POLYP OF STOMACH AND DUODENUM: ICD-10-CM

## 2023-12-27 DIAGNOSIS — Z12.11 ENCOUNTER FOR SCREENING FOR MALIGNANT NEOPLASM OF COLON: ICD-10-CM

## 2023-12-27 PROCEDURE — 82274 ASSAY TEST FOR BLOOD FECAL: CPT | Mod: QW

## 2023-12-27 PROCEDURE — 99214 OFFICE O/P EST MOD 30 MIN: CPT | Mod: 25

## 2023-12-27 RX ORDER — OMEPRAZOLE 20 MG/1
20 CAPSULE, DELAYED RELEASE ORAL
Qty: 90 | Refills: 3 | Status: ACTIVE | COMMUNITY
Start: 2023-06-13 | End: 1900-01-01

## 2023-12-27 RX ORDER — CLARITHROMYCIN 500 MG/1
500 TABLET, FILM COATED ORAL
Refills: 0 | Status: DISCONTINUED | COMMUNITY
End: 2023-12-27

## 2023-12-27 NOTE — HISTORY OF PRESENT ILLNESS
[FreeTextEntry1] : Reflux symptoms continue to be well-controlled on omeprazole.  Repeat EGD 6/7/2023 revealed gastric polyps and small hiatal hernia, with biopsies negative for intestinal metaplasia (short segment Felder esophagus diagnosed April 2022); post-procedure, she was advised that she could try decreasing omeprazole from 40 to 20 mg, but she is uncertain which dose she is currently taking.  Last colonoscopy November 2019 revealed diverticulosis, hemorrhoids and sharp turn (from presumed adhesions).

## 2023-12-27 NOTE — CONSULT LETTER
[Dear  ___] : Dear  [unfilled], [Courtesy Letter:] : I had the pleasure of seeing your patient, [unfilled], in my office today. [Please see my note below.] : Please see my note below. [Consult Closing:] : Thank you very much for allowing me to participate in the care of this patient.  If you have any questions, please do not hesitate to contact me. [Sincerely,] : Sincerely, [FreeTextEntry3] : Francis Reyes M.D.

## 2023-12-27 NOTE — ASSESSMENT
[FreeTextEntry1] : 1.  History of short segment Felder esophagus and duodenal ulcer, with gastric polyps (likely PPI-induced), small hiatal hernia, but no intestinal metaplasia at repeat EGD June 2023. 2.  Diverticulosis, hemorrhoids, presumed adhesions at repeat colonoscopy November 2019. 3.  History of non-Hodgkin lymphoma 1996, status postchemotherapy and bone marrow transplant. 4.  History of bronchiectasis, pneumonia. 5.  Thyroid nodules, subclinical hypothyroidism. 6.  Hyperlipidemia. 7.  Neuropathy right foot. 8.  Osteoarthritis. 9.  Status post total abdominal hysterectomy. 10.  Allergic to Augmentin, levofloxacin.  Plan: 1.  Interim medical records reviewed. 2.  Try decreasing omeprazole to 20 mg daily and continue indefinitely. 3.  Return in 1 year.  Might consider Cologuard rather than repeating colonoscopy, going forward. 4.  Repeat EGD in 5 years, if the potential benefits exceed the risks at that time.

## 2023-12-27 NOTE — PHYSICAL EXAM
[Alert] : alert [Normal Voice/Communication] : normal voice/communication [No Acute Distress] : no acute distress [Well Developed] : well developed [None] : no edema [Bowel Sounds] : normal bowel sounds [Abdomen Tenderness] : non-tender [No Masses] : no abdominal mass palpated [Abdomen Soft] : soft [] : no hepatosplenomegaly [No Hernia] : no hernia [No External Hemorrhoid] : no external hemorrhoids [Occult Blood] : negative occult blood [FIT Test] : negative FIT test [Inguinal Lymph Nodes Enlarged Bilaterally] : no inguinal lymphadenopathy [Normal Color / Pigmentation] : normal skin color and pigmentation [Normal] : oriented to person, place, and time [de-identified] : thin [de-identified] : surgical scar

## 2023-12-28 ENCOUNTER — NON-APPOINTMENT (OUTPATIENT)
Age: 73
End: 2023-12-28

## 2024-01-07 ENCOUNTER — TRANSCRIPTION ENCOUNTER (OUTPATIENT)
Age: 74
End: 2024-01-07

## 2024-01-08 ENCOUNTER — TRANSCRIPTION ENCOUNTER (OUTPATIENT)
Age: 74
End: 2024-01-08

## 2024-01-17 ENCOUNTER — APPOINTMENT (OUTPATIENT)
Dept: ULTRASOUND IMAGING | Facility: IMAGING CENTER | Age: 74
End: 2024-01-17
Payer: MEDICARE

## 2024-01-17 ENCOUNTER — RESULT REVIEW (OUTPATIENT)
Age: 74
End: 2024-01-17

## 2024-01-17 ENCOUNTER — OUTPATIENT (OUTPATIENT)
Dept: OUTPATIENT SERVICES | Facility: HOSPITAL | Age: 74
LOS: 1 days | End: 2024-01-17
Payer: MEDICARE

## 2024-01-17 DIAGNOSIS — E04.2 NONTOXIC MULTINODULAR GOITER: ICD-10-CM

## 2024-01-17 DIAGNOSIS — Z00.8 ENCOUNTER FOR OTHER GENERAL EXAMINATION: ICD-10-CM

## 2024-01-17 PROCEDURE — 10005 FNA BX W/US GDN 1ST LES: CPT

## 2024-01-17 PROCEDURE — 88173 CYTOPATH EVAL FNA REPORT: CPT | Mod: 26

## 2024-01-17 PROCEDURE — 88173 CYTOPATH EVAL FNA REPORT: CPT

## 2024-01-17 PROCEDURE — 88172 CYTP DX EVAL FNA 1ST EA SITE: CPT

## 2024-01-19 LAB — NON-GYNECOLOGICAL CYTOLOGY STUDY: SIGNIFICANT CHANGE UP

## 2024-07-11 ENCOUNTER — APPOINTMENT (OUTPATIENT)
Dept: ENDOCRINOLOGY | Facility: CLINIC | Age: 74
End: 2024-07-11

## 2024-07-11 VITALS
OXYGEN SATURATION: 98 % | BODY MASS INDEX: 21.16 KG/M2 | DIASTOLIC BLOOD PRESSURE: 71 MMHG | SYSTOLIC BLOOD PRESSURE: 127 MMHG | HEART RATE: 71 BPM | TEMPERATURE: 97.4 F | WEIGHT: 112 LBS

## 2024-07-11 PROCEDURE — 99213 OFFICE O/P EST LOW 20 MIN: CPT | Mod: 25

## 2024-07-11 PROCEDURE — 36415 COLL VENOUS BLD VENIPUNCTURE: CPT

## 2024-07-23 LAB
25(OH)D3 SERPL-MCNC: 29.2 NG/ML
ALBUMIN SERPL ELPH-MCNC: 4.3 G/DL
ALT SERPL-CCNC: 14 U/L
ANION GAP SERPL CALC-SCNC: 12 MMOL/L
AST SERPL-CCNC: 19 U/L
BASOPHILS # BLD AUTO: 0.01 K/UL
BASOPHILS NFR BLD AUTO: 0.2 %
BILIRUB SERPL-MCNC: 0.3 MG/DL
BUN SERPL-MCNC: 14 MG/DL
CALCIUM SERPL-MCNC: 9.2 MG/DL
CHLORIDE SERPL-SCNC: 107 MMOL/L
CO2 SERPL-SCNC: 25 MMOL/L
CREAT SERPL-MCNC: 0.96 MG/DL
EGFR: 62 ML/MIN/1.73M2
EOSINOPHIL # BLD AUTO: 0.06 K/UL
GLUCOSE SERPL-MCNC: 95 MG/DL
HCT VFR BLD CALC: 39 %
HGB BLD-MCNC: 11.7 G/DL
IMM GRANULOCYTES NFR BLD AUTO: 0.3 %
LYMPHOCYTES # BLD AUTO: 1.75 K/UL
LYMPHOCYTES NFR BLD AUTO: 27.9 %
MAN DIFF?: NORMAL
MCHC RBC-ENTMCNC: 27.3 PG
MCHC RBC-ENTMCNC: 30 GM/DL
MCV RBC AUTO: 91.1 FL
MONOCYTES # BLD AUTO: 0.43 K/UL
MONOCYTES NFR BLD AUTO: 6.8 %
NEUTROPHILS # BLD AUTO: 4.01 K/UL
NEUTROPHILS NFR BLD AUTO: 63.8 %
PLATELET # BLD AUTO: 170 K/UL
POTASSIUM SERPL-SCNC: 5.1 MMOL/L
PROT SERPL-MCNC: 7.3 G/DL
RBC # BLD: 4.28 M/UL
RBC # FLD: 16 %
SODIUM SERPL-SCNC: 143 MMOL/L
WBC # FLD AUTO: 6.28 K/UL

## 2024-07-24 DIAGNOSIS — E78.5 HYPERLIPIDEMIA, UNSPECIFIED: ICD-10-CM

## 2024-07-27 PROBLEM — R79.89 LOW VITAMIN D LEVEL: Status: ACTIVE | Noted: 2024-07-27

## 2024-09-10 ENCOUNTER — TRANSCRIPTION ENCOUNTER (OUTPATIENT)
Age: 74
End: 2024-09-10

## 2024-11-06 ENCOUNTER — NON-APPOINTMENT (OUTPATIENT)
Age: 74
End: 2024-11-06

## 2024-11-07 ENCOUNTER — TRANSCRIPTION ENCOUNTER (OUTPATIENT)
Age: 74
End: 2024-11-07

## 2024-12-04 ENCOUNTER — OUTPATIENT (OUTPATIENT)
Dept: OUTPATIENT SERVICES | Facility: HOSPITAL | Age: 74
LOS: 1 days | End: 2024-12-04
Payer: MEDICARE

## 2024-12-04 ENCOUNTER — APPOINTMENT (OUTPATIENT)
Dept: ULTRASOUND IMAGING | Facility: IMAGING CENTER | Age: 74
End: 2024-12-04
Payer: MEDICARE

## 2024-12-04 ENCOUNTER — APPOINTMENT (OUTPATIENT)
Dept: RADIOLOGY | Facility: IMAGING CENTER | Age: 74
End: 2024-12-04
Payer: MEDICARE

## 2024-12-04 DIAGNOSIS — Z00.8 ENCOUNTER FOR OTHER GENERAL EXAMINATION: ICD-10-CM

## 2024-12-04 PROCEDURE — 77080 DXA BONE DENSITY AXIAL: CPT | Mod: 26

## 2024-12-04 PROCEDURE — 76536 US EXAM OF HEAD AND NECK: CPT

## 2024-12-04 PROCEDURE — 76536 US EXAM OF HEAD AND NECK: CPT | Mod: 26

## 2024-12-04 PROCEDURE — 77080 DXA BONE DENSITY AXIAL: CPT

## 2024-12-18 ENCOUNTER — TRANSCRIPTION ENCOUNTER (OUTPATIENT)
Age: 74
End: 2024-12-18

## 2024-12-30 ENCOUNTER — TRANSCRIPTION ENCOUNTER (OUTPATIENT)
Age: 74
End: 2024-12-30

## 2025-01-08 ENCOUNTER — APPOINTMENT (OUTPATIENT)
Dept: ENDOCRINOLOGY | Facility: CLINIC | Age: 75
End: 2025-01-08

## 2025-01-08 VITALS
BODY MASS INDEX: 22.28 KG/M2 | DIASTOLIC BLOOD PRESSURE: 79 MMHG | TEMPERATURE: 97.3 F | WEIGHT: 118 LBS | SYSTOLIC BLOOD PRESSURE: 148 MMHG | HEART RATE: 86 BPM | HEIGHT: 61 IN | OXYGEN SATURATION: 99 %

## 2025-01-08 DIAGNOSIS — M81.0 AGE-RELATED OSTEOPOROSIS W/OUT CURRENT PATHOLOGICAL FRACTURE: ICD-10-CM

## 2025-01-08 DIAGNOSIS — E03.8 OTHER SPECIFIED HYPOTHYROIDISM: ICD-10-CM

## 2025-01-08 DIAGNOSIS — E04.2 NONTOXIC MULTINODULAR GOITER: ICD-10-CM

## 2025-01-08 PROCEDURE — 36415 COLL VENOUS BLD VENIPUNCTURE: CPT

## 2025-01-08 PROCEDURE — 99214 OFFICE O/P EST MOD 30 MIN: CPT

## 2025-01-14 ENCOUNTER — NON-APPOINTMENT (OUTPATIENT)
Age: 75
End: 2025-01-14

## 2025-01-14 ENCOUNTER — APPOINTMENT (OUTPATIENT)
Dept: GASTROENTEROLOGY | Facility: CLINIC | Age: 75
End: 2025-01-14
Payer: MEDICARE

## 2025-01-14 VITALS
WEIGHT: 118 LBS | HEIGHT: 61 IN | BODY MASS INDEX: 22.28 KG/M2 | HEART RATE: 86 BPM | DIASTOLIC BLOOD PRESSURE: 75 MMHG | SYSTOLIC BLOOD PRESSURE: 147 MMHG

## 2025-01-14 DIAGNOSIS — Z12.11 ENCOUNTER FOR SCREENING FOR MALIGNANT NEOPLASM OF COLON: ICD-10-CM

## 2025-01-14 DIAGNOSIS — K22.70 BARRETT'S ESOPHAGUS W/OUT DYSPLASIA: ICD-10-CM

## 2025-01-14 DIAGNOSIS — Z80.0 FAMILY HISTORY OF MALIGNANT NEOPLASM OF DIGESTIVE ORGANS: ICD-10-CM

## 2025-01-14 DIAGNOSIS — K21.00 GASTRO-ESOPHAGEAL REFLUX DISEASE WITH ESOPHAGITIS, WITHOUT BLEEDING: ICD-10-CM

## 2025-01-14 DIAGNOSIS — Z63.4 DISAPPEARANCE AND DEATH OF FAMILY MEMBER: ICD-10-CM

## 2025-01-14 DIAGNOSIS — K31.7 POLYP OF STOMACH AND DUODENUM: ICD-10-CM

## 2025-01-14 PROCEDURE — 99214 OFFICE O/P EST MOD 30 MIN: CPT | Mod: 25

## 2025-01-14 PROCEDURE — 82274 ASSAY TEST FOR BLOOD FECAL: CPT | Mod: QW

## 2025-01-14 SDOH — SOCIAL STABILITY - SOCIAL INSECURITY: DISSAPEARANCE AND DEATH OF FAMILY MEMBER: Z63.4

## 2025-01-16 LAB
25(OH)D3 SERPL-MCNC: 39.6 NG/ML
ALBUMIN SERPL ELPH-MCNC: 4.2 G/DL
ALP BLD-CCNC: 93 U/L
ALT SERPL-CCNC: 16 U/L
ANION GAP SERPL CALC-SCNC: 15 MMOL/L
AST SERPL-CCNC: 19 U/L
BASOPHILS # BLD AUTO: 0.02 K/UL
BASOPHILS NFR BLD AUTO: 0.3 %
BILIRUB SERPL-MCNC: 0.3 MG/DL
BUN SERPL-MCNC: 22 MG/DL
CALCIUM SERPL-MCNC: 9.9 MG/DL
CHLORIDE SERPL-SCNC: 104 MMOL/L
CHOLEST SERPL-MCNC: 192 MG/DL
CO2 SERPL-SCNC: 24 MMOL/L
CREAT SERPL-MCNC: 0.9 MG/DL
EGFR: 67 ML/MIN/1.73M2
EOSINOPHIL # BLD AUTO: 0.09 K/UL
EOSINOPHIL NFR BLD AUTO: 1.4 %
ESTIMATED AVERAGE GLUCOSE: 117 MG/DL
GLUCOSE SERPL-MCNC: 95 MG/DL
HBA1C MFR BLD HPLC: 5.7 %
HCT VFR BLD CALC: 37.6 %
HDLC SERPL-MCNC: 60 MG/DL
HGB BLD-MCNC: 11.5 G/DL
IMM GRANULOCYTES NFR BLD AUTO: 0.3 %
LDLC SERPL CALC-MCNC: 116 MG/DL
LYMPHOCYTES # BLD AUTO: 1.8 K/UL
LYMPHOCYTES NFR BLD AUTO: 28.2 %
MAN DIFF?: NORMAL
MCHC RBC-ENTMCNC: 28.3 PG
MCHC RBC-ENTMCNC: 30.6 G/DL
MCV RBC AUTO: 92.6 FL
MONOCYTES # BLD AUTO: 0.45 K/UL
MONOCYTES NFR BLD AUTO: 7.1 %
NEUTROPHILS # BLD AUTO: 4 K/UL
NEUTROPHILS NFR BLD AUTO: 62.7 %
NONHDLC SERPL-MCNC: 132 MG/DL
PLATELET # BLD AUTO: 173 K/UL
POTASSIUM SERPL-SCNC: 4.9 MMOL/L
PROT SERPL-MCNC: 7.5 G/DL
RBC # BLD: 4.06 M/UL
RBC # FLD: 15.5 %
SODIUM SERPL-SCNC: 143 MMOL/L
T4 FREE SERPL-MCNC: 0.9 NG/DL
TRIGL SERPL-MCNC: 90 MG/DL
TSH SERPL-ACNC: 5.98 UIU/ML
VIT B12 SERPL-MCNC: 492 PG/ML
WBC # FLD AUTO: 6.38 K/UL

## 2025-02-27 NOTE — REVIEW OF SYSTEMS
See note - written order to be picked up in office, pending provider signature   [Negative] : Heme/Lymph [As Noted in HPI] : as noted in HPI